# Patient Record
Sex: FEMALE | Race: BLACK OR AFRICAN AMERICAN | Employment: OTHER | URBAN - METROPOLITAN AREA
[De-identification: names, ages, dates, MRNs, and addresses within clinical notes are randomized per-mention and may not be internally consistent; named-entity substitution may affect disease eponyms.]

---

## 2017-06-20 ENCOUNTER — TRANSFERRED RECORDS (OUTPATIENT)
Dept: HEALTH INFORMATION MANAGEMENT | Facility: CLINIC | Age: 72
End: 2017-06-20

## 2017-06-20 LAB
ALT SERPL-CCNC: 15 IU/L
AST SERPL-CCNC: 20 IU/L (ref 5–40)
CREAT SERPL-MCNC: 1.11 MG/DL (ref 0.5–1)
GFR SERPL CREATININE-BSD FRML MDRD: 48 ML/MIN/1.73M2
GLUCOSE SERPL-MCNC: 85 MG/DL (ref 70–100)
LDLC SERPL CALC-MCNC: 86 MG/DL
POTASSIUM SERPL-SCNC: 4.1 MEQ/L (ref 3.5–5.3)
TSH SERPL-ACNC: 0.98 MU/L (ref 0.3–4.2)

## 2017-08-14 ENCOUNTER — OFFICE VISIT (OUTPATIENT)
Dept: INTERNAL MEDICINE | Facility: CLINIC | Age: 72
End: 2017-08-14
Payer: COMMERCIAL

## 2017-08-14 ENCOUNTER — RADIANT APPOINTMENT (OUTPATIENT)
Dept: BONE DENSITY | Facility: CLINIC | Age: 72
End: 2017-08-14
Attending: INTERNAL MEDICINE
Payer: COMMERCIAL

## 2017-08-14 VITALS
HEIGHT: 60 IN | BODY MASS INDEX: 30.35 KG/M2 | WEIGHT: 154.6 LBS | TEMPERATURE: 97.4 F | DIASTOLIC BLOOD PRESSURE: 56 MMHG | OXYGEN SATURATION: 100 % | HEART RATE: 73 BPM | SYSTOLIC BLOOD PRESSURE: 112 MMHG

## 2017-08-14 DIAGNOSIS — J45.909 UNCOMPLICATED ASTHMA, UNSPECIFIED ASTHMA SEVERITY: ICD-10-CM

## 2017-08-14 DIAGNOSIS — R53.83 OTHER FATIGUE: ICD-10-CM

## 2017-08-14 DIAGNOSIS — A04.8 H. PYLORI INFECTION: ICD-10-CM

## 2017-08-14 DIAGNOSIS — Z13.820 SCREENING FOR OSTEOPOROSIS: ICD-10-CM

## 2017-08-14 DIAGNOSIS — M79.10 MUSCLE PAIN: ICD-10-CM

## 2017-08-14 DIAGNOSIS — Z78.0 ASYMPTOMATIC POSTMENOPAUSAL STATUS: ICD-10-CM

## 2017-08-14 DIAGNOSIS — R29.898 LEG WEAKNESS, BILATERAL: ICD-10-CM

## 2017-08-14 DIAGNOSIS — E78.5 HYPERLIPIDEMIA LDL GOAL <130: Primary | ICD-10-CM

## 2017-08-14 DIAGNOSIS — Z95.2 S/P AORTIC VALVE REPLACEMENT: ICD-10-CM

## 2017-08-14 DIAGNOSIS — E55.9 VITAMIN D DEFICIENCY: ICD-10-CM

## 2017-08-14 DIAGNOSIS — R49.0 HOARSENESS: ICD-10-CM

## 2017-08-14 PROBLEM — R41.3 MEMORY DEFICIT: Status: ACTIVE | Noted: 2017-08-14

## 2017-08-14 PROCEDURE — 99204 OFFICE O/P NEW MOD 45 MIN: CPT | Performed by: INTERNAL MEDICINE

## 2017-08-14 PROCEDURE — 77085 DXA BONE DENSITY AXL VRT FX: CPT | Performed by: INTERNAL MEDICINE

## 2017-08-14 RX ORDER — ALBUTEROL SULFATE 90 UG/1
2 AEROSOL, METERED RESPIRATORY (INHALATION) EVERY 6 HOURS PRN
Qty: 1 INHALER | Refills: 1 | COMMUNITY
Start: 2017-08-14 | End: 2019-01-25

## 2017-08-14 RX ORDER — OXYBUTYNIN CHLORIDE 10 MG/1
10 TABLET, EXTENDED RELEASE ORAL DAILY PRN
Qty: 30 TABLET | COMMUNITY
Start: 2017-08-14 | End: 2017-11-14

## 2017-08-14 NOTE — PATIENT INSTRUCTIONS
Plan:  1. Avoid acid ( lemon, orange) and spicy food and coffee   2. Resume Prevacid   3. Physical therapy  [facility], [phone]  4. ENT referral for hoarseness   5. Follow up in 2-3 months for annual exam

## 2017-08-14 NOTE — MR AVS SNAPSHOT
After Visit Summary   8/14/2017    Raissa Encarnacion    MRN: [id number]           Patient Information     Date Of Birth          1945        Visit Information        Provider Department      8/14/2017 12:30 PM Hayley Francisco MD; [facility] [facility]        Today's Diagnoses     Hyperlipidemia LDL goal <130    -  1    Screening for osteoporosis        Asymptomatic postmenopausal status        Uncomplicated asthma, unspecified asthma severity        Muscle pain        Vitamin D deficiency        Other fatigue        H. pylori infection        Leg weakness, bilateral        Hoarseness          Care Instructions    Plan:  1. Avoid acid ( lemon, orange) and spicy food and coffee   2. Resume Prevacid   3. Physical therapy  [facility], [phone]  4. ENT referral for hoarseness   5. Follow up in 2-3 months for annual exam          Follow-ups after your visit        Additional Services     [facility] PT, HAND, AND CHIROPRACTIC REFERRAL       === This order will print in the [facility] Scheduling  Office ===    Your provider has referred you to: [place] [facility], [phone]     Indication/Reason for Referral: gen weakness/leg weakness  Onset of Illness:  months  Therapy Orders: Physical Therapy per protocol or clinical pathway.  Special Programs None   Special Request:Eval & Treat  Modalities: As indicated  Equipment: As indicated    Additional Comments:       Please be aware that coverage of these services is subject to the terms and limitations of your health insurance plan.  Call member services at your health plan with any benefit or coverage questions.      Please bring the following to your appointment:    >>   Any x-rays, CTs or MRIs which have been performed.  Contact the facility where they were done to arrange for  prior to your scheduled appointment.  Any new CT, MRI or other procedures ordered by your specialist  must be performed at a [facility] facility or coordinated by your clinic's referral office.    >>   List of current medications   >>   This referral request   >>   Any documents/labs given to you for this referral            OTOLARYNGOLOGY REFERRAL       Your provider has referred you to:     N: [facility] [place] [phone]   [email or web address]    FHN: [facility] [phone]   [email or web address]  N: [facility] [phone]   [email or web address]  [facility]: [facility] [place]  [facility] [place] [place] [phone]   [email or web address]  N: [place] [facility] [place] [phone]   [email or web address]  N: [place] [facility] ([facility] [phone]   [email or web address]  [facility]: [facility] [phone]   [email or web address]    Please be aware that coverage of these services is subject to the terms and limitations of your health insurance plan.  Call member services at your health plan with any benefit or coverage questions.      Please bring the following to your appointment:  >>   Any x-rays, CTs or MRIs which have been performed.  Contact the facility where they were done to arrange for  prior to your scheduled appointment.  Any new CT, MRI or other procedures ordered by your specialist must be performed at a [facility] facility or coordinated by your clinic's referral office.    >>   List of current medications   >>   This referral request   >>   Any documents/labs given to you for this referral                  Future tests that were ordered for you today     Open Future Orders        Priority Expected Expires Ordered    H Pylori antigen, stool Routine  9/13/2017 8/14/2017    CBC with platelets Routine  8/14/2018  "2017    Comprehensive metabolic panel Routine  2018    Lipid panel reflex to direct LDL Routine  2018    TSH with free T4 reflex Routine  2018    Vitamin D Deficiency Routine  2018    Vitamin B12 Routine  2018    CK total Routine  2018    DX Hip/Pelvis/Spine Routine  2018            Who to contact     If you have questions or need follow up information about today's clinic visit or your schedule please contact [facility] directly at [phone].  Normal or non-critical lab and imaging results will be communicated to you by MyChart, letter or phone within 4 business days after the clinic has received the results. If you do not hear from us within 7 days, please contact the clinic through [facility] or phone. If you have a critical or abnormal lab result, we will notify you by phone as soon as possible.  Submit refill requests through [employer] or call your pharmacy and they will forward the refill request to us. Please allow 3 business days for your refill to be completed.          Additional Information About Your Visit        [facility] Information     [employer] lets you send messages to your doctor, view your test results, renew your prescriptions, schedule appointments and more. To sign up, go to [email or web address] . Click on \"Log in\" on the left side of the screen, which will take you to the Welcome page. Then click on \"Sign up Now\" on the right side of the page.     You will be asked to enter the access code listed below, as well as some personal information. Please follow the directions to create your username and password.     Your access code is: [email or web address]  Expires: 2017  1:49 PM     Your access code will  in 90 days. If you need help or a new code, please call your [facility] or [phone].        Care EveryWhere ID     This is your Care EveryWhere ID. This could " "be used by other organizations to access your [facility] medical records  [id number]        Your Vitals Were     Pulse Temperature Height Pulse Oximetry Breastfeeding? BMI (Body Mass Index)    73 97.4  F (36.3  C) (Oral) 4' 11.5\" (1.511 m) 100% No 30.7 kg/m2       Blood Pressure from Last 3 Encounters:   08/14/17 112/56   11/28/16 133/65    Weight from Last 3 Encounters:   08/14/17 154 lb 9.6 oz (70.1 kg)   10/24/16 154 lb 9.6 oz (70.1 kg)   09/28/16 155 lb 3.2 oz (70.4 kg)              We Performed the Following     Asthma Action Plan (AAP)     [facility] PT, HAND, AND CHIROPRACTIC REFERRAL     OTOLARYNGOLOGY REFERRAL          Today's Medication Changes          These changes are accurate as of: 8/14/17  1:49 PM.  If you have any questions, ask your nurse or doctor.               These medicines have changed or have updated prescriptions.        Dose/Directions    DITROPAN XL 10 MG 24 hr tablet   This may have changed:    - when to take this  - reasons to take this   Generic drug:  oxybutynin   Changed by:  Hayley Francisco MD        Dose:  10 mg   Take 1 tablet (10 mg) by mouth daily as needed for bladder spasms   Quantity:  30 tablet   Refills:  0         Stop taking these medicines if you haven't already. Please contact your care team if you have questions.     LIPITOR 20 MG tablet   Generic drug:  atorvastatin   Stopped by:  Hayley Francisco MD                    Primary Care Provider    None Specified       No primary provider on file.        Equal Access to Services     [facility]: Hadii andrey morales Sotavo, waaxda luqadaha, qaybta kaalmada josef godoy . So [facility] [phone].    ATENCIÓN: Si habla español, tiene a montes de oca disposición servicios gratuitos de asistencia lingüística. Llame al [phone].    We comply with applicable federal civil rights laws and [place] laws. We do not discriminate on the basis of race, color, national origin, " age, disability sex, sexual orientation or gender identity.            Thank you!     Thank you for choosing [facility]  for your care. Our goal is always to provide you with excellent care. Hearing back from our patients is one way we can continue to improve our services. Please take a few minutes to complete the written survey that you may receive in the mail after your visit with us. Thank you!             Your Updated Medication List - Protect others around you: Learn how to safely use, store and throw away your medicines at www.disposemymeds.org.          This list is accurate as of: 8/14/17  1:49 PM.  Always use your most recent med list.                   Brand Name Dispense Instructions for use Diagnosis    * albuterol (2.5 MG/3ML) 0.083% neb solution     75 mL    Take 1 vial (2.5 mg) by nebulization every 6 hours as needed for shortness of breath / dyspnea or wheezing        * albuterol 108 (90 BASE) MCG/ACT Inhaler    PROAIR HFA/PROVENTIL HFA/VENTOLIN HFA    1 Inhaler    Inhale 2 puffs into the lungs every 6 hours as needed for shortness of breath / dyspnea or wheezing        aspirin EC 81 MG EC tablet      Take 81 mg by mouth        calcium carbonate 500 MG chewable tablet    TUMS     1 tab q6h prn heartburn        carboxymethylcellulose 0.5 % Soln ophthalmic solution    REFRESH PLUS     1 drop        cholecalciferol 1000 UNIT tablet    vitamin D     Take 1,000 Units by mouth        DITROPAN XL 10 MG 24 hr tablet   Generic drug:  oxybutynin     30 tablet    Take 1 tablet (10 mg) by mouth daily as needed for bladder spasms        loratadine 10 MG tablet    CLARITIN     Take 10 mg by mouth        MULTI-VITAMINS Tabs      Take 1 tablet by mouth        TOPROL XL 25 MG 24 hr tablet   Generic drug:  metoprolol      Take 25 mg by mouth        * Notice:  This list has 2 medication(s) that are the same as other medications prescribed for you. Read the directions carefully, and ask your doctor or  other care provider to review them with you.

## 2017-08-14 NOTE — PROGRESS NOTES
Dr Story's note      Patient's instructions / PLAN:                                                        Plan:  1. Avoid acid ( lemon, orange) and spicy food and coffee   2. Resume Prevacid   3. Physical therapy  [facility], [phone]  4. ENT referral for hoarseness   5. Follow up in 2-3 months for annual exam        ASSESSMENT & PLAN:                                                      Complicated medical case, but her advantage to her daughter is in top of all the medical problems, takes a good care of her mother and she makes sure she has all her appointment and tests done.    (E78.5) Hyperlipidemia LDL goal <130  (primary encounter diagnosis)  Comment:   Plan: CBC with platelets, Comprehensive metabolic         panel, Lipid panel reflex to direct LDL, TSH         with free T4 reflex            (Z95.2) S/P aortic valve replacement Abbott, life long ASA, Plavix  Comment: Doing well  Plan: Continue same meds, same doses for now     (J45.909) Uncomplicated asthma, unspecified asthma severity  Comment: stable   Plan: Asthma Action Plan (AAP), CANCELED: DX         Hip/Pelvis/Spine            (M79.1) Muscle pain  Comment:   Plan: CK total            (E55.9) Vitamin D deficiency  Comment:   Plan: Vitamin D Deficiency            (R53.83) Other fatigue  Comment:   Plan: CBC with platelets, Comprehensive metabolic         panel, Vitamin B12            (A04.8) H. pylori infection  Comment:   Plan: H Pylori antigen, stool            (R29.898) Leg weakness, bilateral  Comment:   Plan: [facility] PT, HAND, AND CHIROPRACTIC REFERRAL            (R49.0) Hoarseness  Comment:   Plan: OTOLARYNGOLOGY REFERRAL            (Z13.820) Screening for osteoporosis  Comment:   Plan: CANCELED: DX Hip/Pelvis/Spine            (Z78.0) Asymptomatic postmenopausal status  Comment:   Plan:        Chief complaint:                                                      Follow up chronic medical problems    daughter and official  " are present     SUBJECTIVE:                                                    Raissa Encarnacion is a 72 year old female who presents to clinic today for the following health issues:    The patient used to see dr at [facility], they ( patient and daughter) loved the doctor but traveling has becoming harder and they want a pcp closer to their home.     She had AV replacement Sept 2016, she will be on Plavix until Nov 2017 and life long ASA. She c/o of tenderness over the ant chest scar: cheloid. She will ds this with the thoracic surgeon.     Had labs June 2017: I reviewed them and they are in acceptable range including the cholesterol.     Had history of H. pylori, she had treatment for this.  She still has heartburns.  She stopped taking the medications for heartburn.  Now she drinks lemon juice with water in the morning based on a relative advice.    She has memory problems but her daughter is helping with all the care, as above    She is generalized weak, leg weakness.  I suggested physical therapy and her daughter is very interested in this.    She has history of asthma and has received numerous cortisone treatments in [place] country.  Daughter agreed to schedule DEXA    Hoarseness for few months.  We will treat GERD but difficult course and this persists she will see ENT    Review of Systems:                                                      ROS: negative for fever, chills, cough, wheezes, chest pain, shortness of breath, vomiting, abdominal pain, leg swelling     A 10-point review of systems was obtained.  Those pertinent are above and in the in the Subjective section.  The rest of the systems are negative.           OBJECTIVE:             Physical exam:  Blood pressure 112/56, pulse 73, temperature 97.4  F (36.3  C), temperature source Oral, height 4' 11.5\" (1.511 m), weight 154 lb 9.6 oz (70.1 kg), SpO2 100 %, not currently breastfeeding.   NAD, appears comfortable  Skin: no rashes   HEENT: KINZA TAY, " pink conjunctiva, anicteric sclerae, bilateral tympanic membranes are clinically normal, oropharynx is normal color  Neck: supple, no JVD,No thyroidmegaly. Lymph nodes nonpalpable cervical and supraclavicular.  Chest: clear to auscultation bilaterally, good respiratory effort  Heart: S1 S2, RRR, no mgr appreciated  Abdomen: soft, not tender, no hepatosplenomegaly or masses appreciated, no abdominal bruit, present bowel sounds  Extremities: no edema,   Neurologic: A,  no focal signs appreciated    Past Medical History:   Diagnosis Date     Memory deficit 8/14/2017     S/P aortic valve replacement Abbott, life long ASA, Plavix 9/14/2016   as above       PSH: A valve replac    Meds: reviewed  Current Outpatient Prescriptions   Medication Sig Dispense Refill     oxybutynin (DITROPAN XL) 10 MG 24 hr tablet Take 1 tablet (10 mg) by mouth daily as needed for bladder spasms 30 tablet      albuterol (PROAIR HFA/PROVENTIL HFA/VENTOLIN HFA) 108 (90 BASE) MCG/ACT Inhaler Inhale 2 puffs into the lungs every 6 hours as needed for shortness of breath / dyspnea or wheezing 1 Inhaler 1     aspirin EC 81 MG tablet Take 81 mg by mouth       calcium carbonate (TUMS) 500 MG chewable tablet 1 tab q6h prn heartburn       carboxymethylcellulose (REFRESH PLUS) 0.5 % SOLN 1 drop       cholecalciferol (VITAMIN D3) 1000 UNIT tablet Take 1,000 Units by mouth       loratadine (CLARITIN) 10 MG tablet Take 10 mg by mouth       metoprolol (TOPROL XL) 25 MG 24 hr tablet Take 25 mg by mouth       Multiple Vitamin (MULTI-VITAMINS) TABS Take 1 tablet by mouth       albuterol (2.5 MG/3ML) 0.083% nebulizer solution Take 1 vial (2.5 mg) by nebulization every 6 hours as needed for shortness of breath / dyspnea or wheezing 75 mL 0       Soc Hx: reviewed  Fam Hx: reviewed          Hayley Story MD  Internal Medicine

## 2017-08-14 NOTE — NURSING NOTE
"Chief Complaint   Patient presents with     Establish Care       Initial /56 (BP Location: Right arm, Patient Position: Chair, Cuff Size: Adult Large)  Pulse 73  Temp 97.4  F (36.3  C) (Oral)  Ht 4' 11.5\" (1.511 m)  Wt 154 lb 9.6 oz (70.1 kg)  SpO2 100%  Breastfeeding? No  BMI 30.7 kg/m2 Estimated body mass index is 30.7 kg/(m^2) as calculated from the following:    Height as of this encounter: 4' 11.5\" (1.511 m).    Weight as of this encounter: 154 lb 9.6 oz (70.1 kg).  Medication Reconciliation: complete   Mammogram and colonoscopy declined today-she does not want to do these now or in the future. No need to call for health maintenance reminders for this.   Vicki Nguyen CMA      "

## 2017-08-15 ASSESSMENT — ASTHMA QUESTIONNAIRES: ACT_TOTALSCORE: 18

## 2017-08-25 DIAGNOSIS — Z95.2 S/P AORTIC VALVE REPLACEMENT: Primary | ICD-10-CM

## 2017-08-25 RX ORDER — METOPROLOL SUCCINATE 25 MG/1
TABLET, EXTENDED RELEASE ORAL
Qty: 90 TABLET | Refills: 0 | Status: SHIPPED | OUTPATIENT
Start: 2017-08-25 | End: 2017-11-14

## 2017-08-25 NOTE — TELEPHONE ENCOUNTER
metoprolol      Last Written Prescription Date: historical  Last Fill Quantity: 0, # refills: 0    Last Office Visit with G, P or [facility] prescribing provider:  8/14/17   Future Office Visit:    Next 5 appointments (look out 90 days)     Nov 14, 2017 10:20 AM CST   [facility]YS[facility] Francisco MD   [facility]    [address]  [facility] [id number] [phone]                    BP Readings from Last 3 Encounters:   08/14/17 112/56   11/28/16 133/65

## 2017-09-18 ENCOUNTER — CARE COORDINATION (OUTPATIENT)
Dept: GERIATRIC MEDICINE | Facility: CLINIC | Age: 72
End: 2017-09-18

## 2017-09-18 NOTE — PROGRESS NOTES
New member with [facility] effective 9/1/17. Welcome letter mailed on 9/7/17.    PC to Cecelia, daughter.  She said she was very unhappy with the last assessment. She said the CM was rude and rigid.  Not happy with the PCA results.  She said she does everything for her mom.  Talked about how PCA assesses for the hands on cares.  The IADL's are covered under the homemaking services.  That seemed to help ease her frustration. Reviewed the PCA assessment. Talked about the three areas that did not get counted on the assessment as a dependency- transferring, mobility, and positioning. Cecelia said her mom and transfer and position herself.  They help her with mobility. She can walk around the apartment but Cecelia or her family is there beside her to supervise and monitor. She leave the building everyday and needs support on the one step into the building.  Has weakness from her health and surgery last year.  Let her know that if I came to do assessment it would possibly change the hours by 30 minute increase.  Let her know I could come now to reassess, or come after PT has completed to see if there is improvement. She said her mom has done PT before and it didn't give that much improvement. She would like new assessment. Daughter has her set up for PT, will be making ENT appt, and has completed DEXA scan.  Cecelia reports that she keeps track of everything for her mom from household to meds and doctor appts. Scheduled home visit for 9/21/17 at 9:30.    PC to K[facility] and schedule . Requested Kristy or female .    OTIS Carr  [facility]   [phone] (fax)  [email or web address]

## 2017-09-21 ENCOUNTER — CARE COORDINATION (OUTPATIENT)
Dept: GERIATRIC MEDICINE | Facility: CLINIC | Age: 72
End: 2017-09-21

## 2017-09-21 DIAGNOSIS — E78.5 HYPERLIPIDEMIA LDL GOAL <130: ICD-10-CM

## 2017-09-21 DIAGNOSIS — M79.10 MUSCLE PAIN: ICD-10-CM

## 2017-09-21 DIAGNOSIS — R53.83 OTHER FATIGUE: ICD-10-CM

## 2017-09-21 DIAGNOSIS — E55.9 VITAMIN D DEFICIENCY: ICD-10-CM

## 2017-09-21 LAB
ALBUMIN SERPL-MCNC: 3.8 G/DL (ref 3.4–5)
ALP SERPL-CCNC: 88 U/L (ref 40–150)
ALT SERPL W P-5'-P-CCNC: 24 U/L (ref 0–50)
ANION GAP SERPL CALCULATED.3IONS-SCNC: 7 MMOL/L (ref 3–14)
AST SERPL W P-5'-P-CCNC: 16 U/L (ref 0–45)
BILIRUB SERPL-MCNC: 0.6 MG/DL (ref 0.2–1.3)
BUN SERPL-MCNC: 12 MG/DL (ref 7–30)
CALCIUM SERPL-MCNC: 9.3 MG/DL (ref 8.5–10.1)
CHLORIDE SERPL-SCNC: 106 MMOL/L (ref 94–109)
CHOLEST SERPL-MCNC: 261 MG/DL
CK SERPL-CCNC: 74 U/L (ref 30–225)
CO2 SERPL-SCNC: 28 MMOL/L (ref 20–32)
CREAT SERPL-MCNC: 1 MG/DL (ref 0.52–1.04)
ERYTHROCYTE [DISTWIDTH] IN BLOOD BY AUTOMATED COUNT: 13.4 % (ref 10–15)
GFR SERPL CREATININE-BSD FRML MDRD: 54 ML/MIN/1.7M2
GLUCOSE SERPL-MCNC: 96 MG/DL (ref 70–99)
HCT VFR BLD AUTO: 38.7 % (ref 35–47)
HDLC SERPL-MCNC: 46 MG/DL
HGB BLD-MCNC: 13 G/DL (ref 11.7–15.7)
LDLC SERPL CALC-MCNC: 179 MG/DL
MCH RBC QN AUTO: 29.2 PG (ref 26.5–33)
MCHC RBC AUTO-ENTMCNC: 33.6 G/DL (ref 31.5–36.5)
MCV RBC AUTO: 87 FL (ref 78–100)
NONHDLC SERPL-MCNC: 215 MG/DL
PLATELET # BLD AUTO: 175 10E9/L (ref 150–450)
POTASSIUM SERPL-SCNC: 4.6 MMOL/L (ref 3.4–5.3)
PROT SERPL-MCNC: 7.2 G/DL (ref 6.8–8.8)
RBC # BLD AUTO: 4.45 10E12/L (ref 3.8–5.2)
SODIUM SERPL-SCNC: 141 MMOL/L (ref 133–144)
TRIGL SERPL-MCNC: 182 MG/DL
TSH SERPL DL<=0.005 MIU/L-ACNC: 1.36 MU/L (ref 0.4–4)
VIT B12 SERPL-MCNC: 506 PG/ML (ref 193–986)
WBC # BLD AUTO: 7.5 10E9/L (ref 4–11)

## 2017-09-21 PROCEDURE — 80061 LIPID PANEL: CPT | Performed by: INTERNAL MEDICINE

## 2017-09-21 PROCEDURE — 82306 VITAMIN D 25 HYDROXY: CPT | Performed by: INTERNAL MEDICINE

## 2017-09-21 PROCEDURE — 82607 VITAMIN B-12: CPT | Performed by: INTERNAL MEDICINE

## 2017-09-21 PROCEDURE — 36415 COLL VENOUS BLD VENIPUNCTURE: CPT | Performed by: INTERNAL MEDICINE

## 2017-09-21 PROCEDURE — 80053 COMPREHEN METABOLIC PANEL: CPT | Performed by: INTERNAL MEDICINE

## 2017-09-21 PROCEDURE — 82550 ASSAY OF CK (CPK): CPT | Performed by: INTERNAL MEDICINE

## 2017-09-21 PROCEDURE — 85027 COMPLETE CBC AUTOMATED: CPT | Performed by: INTERNAL MEDICINE

## 2017-09-21 PROCEDURE — 84443 ASSAY THYROID STIM HORMONE: CPT | Performed by: INTERNAL MEDICINE

## 2017-09-21 RX ORDER — PANTOPRAZOLE SODIUM 40 MG/1
40 TABLET, DELAYED RELEASE ORAL
COMMUNITY
Start: 2017-08-31 | End: 2018-02-22

## 2017-09-21 NOTE — PROGRESS NOTES
Home visit/Michael Risk Assessment/EW screening completed on: 9/21/17 with Raissa, daughter Cecelia, this Care Coordinator, and .  Had a long discussion about current needs, what is assessed to qualify for services, and how we can support independence. Raissa has experienced much leg weakness and general weakness since her heart surgery in November 2016. Her asthma also affects her ability to manage her cares.  Raissa was independent prior to her surgery and plans to become as independent as possible. PT was referred by PCP and is scheduled to start in October.  Has ENT visit to evaluate issues with hoarseness in her throat.   Needs assistance with dressing, grooming, bathing, mobility, toileting, shopping, cooking, cleaning, laundry, financial management, and med management.  Raissa's telly is important to her and she enjoys being with her family.     Member resides: Lives with her daughter and two grandchildren ages 4 and 6 in a two bedroom apartment.   Member currently receiving the following services: PCA and homemaking    See EMR for a list of client's diagnoses and medications.     Medication management:   Medications reviewed:Yes.   Medication management: Care giver administers medication.   Medication understanding:Caregiver has understanding of regimen and is able to complete med set up/administration Yes. MTM offered- gave phone number for scheduling line.    Member Mood/behavior-PHQ9 score:  0 Any needs to f/u with PCP on PHQ9 score- no    Plan of Care: Early assessment was completed because member and daughter was not happy with the last  or assessment.  They did not feel that her needs were captured by last assessment, especially with PCA hours.  Recommendation is for member to continue with PCA and homemaking, as well as add lifeline and pads.  PCA hours have increased from 3 hrs/day to 3.5 hrs/day. Member will be starting PT in October.     Follow-Up Plan: Member informed of future  contact, plan to f/u with member with a 6 month telephone assessment.  Contact information shared with member and family, encouraged member to call with any questions or concerns prior to this.    See [facility] for further detailed information.    OTIS Carr  [employer]   [phone] (fax)  [email or web address]

## 2017-09-21 NOTE — PROGRESS NOTES
Order placed with [facility] (p: [phone]; f: [phone]) for pads.  Order placed on 09/21/2017. Access updated.  As required, authorization submitted to health plan.      Annie Lozada  Case Management Specialist  [facility]   [phone]

## 2017-09-22 ENCOUNTER — TELEPHONE (OUTPATIENT)
Dept: INTERNAL MEDICINE | Facility: CLINIC | Age: 72
End: 2017-09-22

## 2017-09-22 LAB — DEPRECATED CALCIDIOL+CALCIFEROL SERPL-MC: 39 UG/L (ref 20–75)

## 2017-09-23 NOTE — TELEPHONE ENCOUNTER
There are no records in [facility] or [facility], about possible urinary incontinence  For not signed  Form can be addressed at the next office visit

## 2017-09-25 ENCOUNTER — TRANSFERRED RECORDS (OUTPATIENT)
Dept: HEALTH INFORMATION MANAGEMENT | Facility: CLINIC | Age: 72
End: 2017-09-25

## 2017-10-05 ENCOUNTER — CARE COORDINATION (OUTPATIENT)
Dept: GERIATRIC MEDICINE | Facility: CLINIC | Age: 72
End: 2017-10-05

## 2017-10-05 NOTE — PROGRESS NOTES
Received after visit chart from care coordinator.  Completed following tasks: Mailed copy of care plan to client  Updated services in access  Submitted referrals/auths for Homemaking  Entered MMIS  Chart was returned to DANDRE.     Annie Lozada  Case Management Specialist  [facility]   [phone]

## 2017-10-05 NOTE — PROGRESS NOTES
Made referral for GPS lifeline with  [employer].  Button is on back order but they will call daughter to set up once they get it in.    PC to Cecelia, daughter. Let her know lifeline referral has been made. Button on back order. Pads order was made but PCP wants to see member before she will sign off on rx. They will discuss at next visit in November. Let her know that [facility] has nebulizers so once she gets the rx I can help her get that as needed.    OTIS Carr  [employer]   [phone] (fax)  [email or web address]

## 2017-10-05 NOTE — PROGRESS NOTES
[facility]:  Faxed completed PCA assessment and authorization letter to PCA Agency and mailed copies to member.  Faxed authorization letter and MD Communication to PCP.  Emailed referral form for auth to [facility]. Two auths were completed for the date spans of 09/01/2017-09/22/2017 and 09/23/2017 - 08/31/2018.    Annie Lozada  Case Management Specialist  [facility]   [phone]

## 2017-10-09 ENCOUNTER — THERAPY VISIT (OUTPATIENT)
Dept: PHYSICAL THERAPY | Facility: CLINIC | Age: 72
End: 2017-10-09
Payer: COMMERCIAL

## 2017-10-09 DIAGNOSIS — R29.898 LEG WEAKNESS, BILATERAL: Primary | ICD-10-CM

## 2017-10-09 PROCEDURE — 97110 THERAPEUTIC EXERCISES: CPT | Mod: GP | Performed by: PHYSICAL THERAPIST

## 2017-10-09 PROCEDURE — 97162 PT EVAL MOD COMPLEX 30 MIN: CPT | Mod: GP | Performed by: PHYSICAL THERAPIST

## 2017-10-09 NOTE — MR AVS SNAPSHOT
"              After Visit Summary   10/9/2017    Raissa Encarnacion    MRN: [id number]           Patient Information     Date Of Birth          1945        Visit Information        Provider Department      10/9/2017 11:30 AM Lena Santiago, PT; [employer] [facility]        Today's Diagnoses     Leg weakness, bilateral    -  1       Follow-ups after your visit        Your next 10 appointments already scheduled     Oct 13, 2017  9:40 AM CDT   AVTAR Extremity with Vivienne Jaramillo PT   [facility] ([facility]  )    [address]  [address]  [facility] [address]   [phone]            Nov 14, 2017 10:20 AM CST   PHYSICAL with Hayley Francisco MD   [facility] ([facility])    [address] [place]  [facility] [id number] [phone]              Who to contact     If you have questions or need follow up information about today's clinic visit or your schedule please contact [facility] directly at [phone].  Normal or non-critical lab and imaging results will be communicated to you by [employer], letter or phone within 4 business days after the clinic has received the results. If you do not hear from us within 7 days, please contact the clinic through [employer] or phone. If you have a critical or abnormal lab result, we will notify you by phone as soon as possible.  Submit refill requests through [employer] or call your pharmacy and they will forward the refill request to us. Please allow 3 business days for your refill to be completed.          Additional Information About Your Visit        [employer] Information     Very[employer]Room lets you send messages to your doctor, view your test results, renew your prescriptions, schedule appointments and more. To sign up, go to [email or web address] . Click on \"Log in\" on the left side of the screen, which will take you to the Welcome page. Then click on \"Sign up Now\" on the " right side of the page.     You will be asked to enter the access code listed below, as well as some personal information. Please follow the directions to create your username and password.     Your access code is: [email or web address]  Expires: 2017  1:49 PM     Your access code will  in 90 days. If you need help or a new code, please call your [facility] clinic or [phone].        Care EveryWhere ID     This is your Care EveryWhere ID. This could be used by other organizations to access your [facility] medical records  [id number]         Blood Pressure from Last 3 Encounters:   17 112/56   16 133/65    Weight from Last 3 Encounters:   17 70.1 kg (154 lb 9.6 oz)   10/24/16 70.1 kg (154 lb 9.6 oz)   16 70.4 kg (155 lb 3.2 oz)              We Performed the Following     HC PT EVAL, MODERATE COMPLEXITY     AVTAR INITIAL EVAL REPORT     THERAPEUTIC EXERCISES        Primary Care Provider Office Phone # Fax #    Hayley Saima Francisco [phone]       [address] [facility] [address]        Equal Access to Services     [employer] : Hadii aad ku hadasho Soomaali, waaxda luqadaha, qaybta kaalmada adeegyada, josef maddox haycecyn abebe young . So [facility] [phone].    ATENCIÓN: Si habla español, tiene a montes de oca disposición servicios gratuitos de asistencia lingüística. Llame al [phone].    We comply with applicable federal civil rights laws and [place] laws. We do not discriminate on the basis of race, color, national origin, age, disability, sex, sexual orientation, or gender identity.            Thank you!     Thank you for choosing [facility]  for your care. Our goal is always to provide you with excellent care. Hearing back from our patients is one way we can continue to improve our services. Please take a few minutes to complete the written survey that you may receive in the mail after your visit with us. Thank you!             Your  Updated Medication List - Protect others around you: Learn how to safely use, store and throw away your medicines at www.disposemymeds.org.          This list is accurate as of: 10/9/17  1:16 PM.  Always use your most recent med list.                   Brand Name Dispense Instructions for use Diagnosis    * albuterol (2.5 MG/3ML) 0.083% neb solution     75 mL    Take 1 vial (2.5 mg) by nebulization every 6 hours as needed for shortness of breath / dyspnea or wheezing        * albuterol 108 (90 BASE) MCG/ACT Inhaler    PROAIR HFA/PROVENTIL HFA/VENTOLIN HFA    1 Inhaler    Inhale 2 puffs into the lungs every 6 hours as needed for shortness of breath / dyspnea or wheezing        aspirin EC 81 MG EC tablet      Take 81 mg by mouth        calcium carbonate 500 MG chewable tablet    TUMS     1 tab q6h prn heartburn        carboxymethylcellulose 0.5 % Soln ophthalmic solution    REFRESH PLUS     1 drop        cholecalciferol 1000 UNIT tablet    vitamin D     Take 1,000 Units by mouth        DITROPAN XL 10 MG 24 hr tablet   Generic drug:  oxybutynin     30 tablet    Take 1 tablet (10 mg) by mouth daily as needed for bladder spasms        loratadine 10 MG tablet    CLARITIN     Take 10 mg by mouth        metoprolol 25 MG 24 hr tablet    TOPROL-XL    90 tablet    TAKE 1 TABLET BY MOUTH EVERY DAY    S/P aortic valve replacement       MULTI-VITAMINS Tabs      Take 1 tablet by mouth        pantoprazole 40 MG EC tablet    PROTONIX     Take 40 mg by mouth daily (with breakfast)        * Notice:  This list has 2 medication(s) that are the same as other medications prescribed for you. Read the directions carefully, and ask your doctor or other care provider to review them with you.

## 2017-10-09 NOTE — PROGRESS NOTES
"Subjective:    Patient is a 72 year old female presenting with rehab back hpi. The history is provided by the patient. A  was used (Zimbabwean).   Affected Side: B leg weakness.    Condition occurred: for unknown reasons.  This is a chronic condition  \"Overall my body muscles/nerves hurt\". Reports chief c/o B leg weakness with walking and feels like they give out occasionally. Also difficulty with getting up from low seat. Denies N/T. Lives in apartment, no stairs, doesn't do a lot of activity. Had open-heart surgery ~August 2016. Did cardiac rehab for 1 month post-op then stopped but feels afraid of falling due to leg strength. Does walk hallways at apartment. No falls but has a few \"close calls\". Goal is to get stronger and feel more steady on feet. .      Radiates to:  Thigh right, thigh left, knee left and knee right (crepitus B knees).  Pain is described as aching and is constant and reported as 7/10.   Pain is worse during the day.  Symptoms are exacerbated by walking, sitting and standing and relieved by rest.  Since onset symptoms are gradually worsening.        General health as reported by patient is good.  Pertinent medical history includes:  Rheumatoid arthritis and heart problems.  Medical allergies: no.  Other surgeries include:  Heart surgery (open heart surgery Aug 2016).  Current medications:  Cardiac medication.  Current occupation is retired.                                    Objective:      Gait:  Slowed, lateral shifting noted  Gait Type:  Antalgic                                                           Knee Evaluation:  ROM:  Strength wnl knee: SLR R x 7 reps with good control, L x3 then extensor lag starts.    PROM      Extension: Left: 0    Right:  0  Flexion: Left: 110    Right:  115      Strength:         Quad Set Left:  Poor    Pain: +   Quad Set Right:  Fair    Pain: +/-  Ligament Testing:  Not Assessed                Special Tests:   Left knee positive for the " following special tests:  Patellar Compression  Right knee positive for the following tests:  Patellar Compression    Edema:  Normal    Mobility Testing:      Patellofemoral Medial:  Left: normal    Right: normal  Patellofemoral Lateral:  Left: normal    Right: normal  Patellofemoral Superior:  Left: normal    Right: normal  Patellofemoral Inferior:  Left: normal    Right: normal  Functional Testing:            Proprioception:   Stork Balance Test:  Left:  Wouldn't try without hand support  Right:  Wouldn't try without hand support  % of Uninvolved:           General     ROS    Assessment/Plan:      Patient is a 72 year old female with both sides knee complaints.    Patient has the following significant findings with corresponding treatment plan.                Diagnosis 1:  B knee pain  Pain -  hot/cold therapy, manual therapy, splint/taping/bracing/orthotics, self management, education and home program  Decreased ROM/flexibility - manual therapy and therapeutic exercise  Decreased joint mobility - manual therapy and therapeutic exercise  Decreased strength - therapeutic exercise and therapeutic activities  Impaired balance - neuro re-education and therapeutic activities  Decreased proprioception - neuro re-education and therapeutic activities  Inflammation - cold therapy and self management/home program  Impaired gait - gait training  Impaired muscle performance - neuro re-education  Decreased function - therapeutic activities  Impaired posture - neuro re-education    Therapy Evaluation Codes:   1) History comprised of:   Personal factors that impact the plan of care:      Time since onset of symptoms.    Comorbidity factors that impact the plan of care are:      Heart problems.     Medications impacting care: None.  2) Examination of Body Systems comprised of:   Body structures and functions that impact the plan of care:      Knee.   Activity limitations that impact the plan of care are:      Sitting,  Squatting/kneeling, Stairs and Walking.  3) Clinical presentation characteristics are:   Evolving/Changing.  4) Decision-Making    Moderate complexity using standardized patient assessment instrument and/or measureable assessment of functional outcome.  Cumulative Therapy Evaluation is: Moderate complexity.    Previous and current functional limitations:  (See Goal Flow Sheet for this information)    Short term and Long term goals: (See Goal Flow Sheet for this information)     Communication ability:  Patient appears to be able to clearly communicate and understand verbal and written communication and follow directions correctly.  Treatment Explanation - The following has been discussed with the patient:   RX ordered/plan of care  Anticipated outcomes  Possible risks and side effects  This patient would benefit from PT intervention to resume normal activities.   Rehab potential is good.    Frequency:  2 X week, once daily  Duration:  for 3 weeks tapering to 1 X a week over 2 weeks  Discharge Plan:  Achieve all LTG.  Independent in home treatment program.  Reach maximal therapeutic benefit.    Please refer to the daily flowsheet for treatment today, total treatment time and time spent performing 1:1 timed codes.

## 2017-10-13 ENCOUNTER — THERAPY VISIT (OUTPATIENT)
Dept: PHYSICAL THERAPY | Facility: CLINIC | Age: 72
End: 2017-10-13
Payer: COMMERCIAL

## 2017-10-13 DIAGNOSIS — R29.898 LEG WEAKNESS, BILATERAL: ICD-10-CM

## 2017-10-13 PROCEDURE — 97110 THERAPEUTIC EXERCISES: CPT | Mod: GP | Performed by: PHYSICAL THERAPIST

## 2017-10-16 ENCOUNTER — CARE COORDINATION (OUTPATIENT)
Dept: GERIATRIC MEDICINE | Facility: CLINIC | Age: 72
End: 2017-10-16

## 2017-10-20 ENCOUNTER — THERAPY VISIT (OUTPATIENT)
Dept: PHYSICAL THERAPY | Facility: CLINIC | Age: 72
End: 2017-10-20
Payer: COMMERCIAL

## 2017-10-20 DIAGNOSIS — R29.898 LEG WEAKNESS, BILATERAL: ICD-10-CM

## 2017-10-20 PROCEDURE — 97110 THERAPEUTIC EXERCISES: CPT | Mod: GP

## 2017-10-25 ENCOUNTER — CARE COORDINATION (OUTPATIENT)
Dept: GERIATRIC MEDICINE | Facility: CLINIC | Age: 72
End: 2017-10-25

## 2017-10-31 ENCOUNTER — CARE COORDINATION (OUTPATIENT)
Dept: GERIATRIC MEDICINE | Facility: CLINIC | Age: 72
End: 2017-10-31

## 2017-11-03 ENCOUNTER — THERAPY VISIT (OUTPATIENT)
Dept: PHYSICAL THERAPY | Facility: CLINIC | Age: 72
End: 2017-11-03
Payer: COMMERCIAL

## 2017-11-03 DIAGNOSIS — R29.898 LEG WEAKNESS, BILATERAL: ICD-10-CM

## 2017-11-03 PROCEDURE — 97112 NEUROMUSCULAR REEDUCATION: CPT | Mod: GP | Performed by: PHYSICAL THERAPIST

## 2017-11-03 PROCEDURE — 97110 THERAPEUTIC EXERCISES: CPT | Mod: GP | Performed by: PHYSICAL THERAPIST

## 2017-11-06 NOTE — PROGRESS NOTES
10/25/17  CMS confirmed PCA auths with [employer]. 9/01-9/22/17 - 3 hrs/day - Auth # [id number]; 9/23/17-3/31/18 - 3.5 hrs/day - Auth # [id number]. She also called PCA agency with updates.    OTIS Carr  [employer]   [phone] (fax)  [email or web address]

## 2017-11-06 NOTE — PROGRESS NOTES
10/16/17  VM from [facility] at re: Raissa Encarnacion. She verifed auth for the client. Looks okay but one thing is that the client has a previous auth of 9/4/17 end date is 7/31/18 for 3 hrs/day. [id number] auth number. [place] [id number] group number. end it 9/24? wants to know if I can request to end that auth on 9/24/17. they are overlapping. they told her to contact me to have it ended. [phone] ext [phone]  operations.   PC to [facility].  Let her know I will check into this. Let her know I had the dates  from 9/1/17 to 9/22/17 and then 9/23/17 to 8/31/18.  Emailed CMS asking they check with [employer] to get auth straightened out.    OTIS Carr  [employer]   [phone] (fax)  [email or web address]

## 2017-11-06 NOTE — PROGRESS NOTES
9/21/17  L/M for Amanda at [facility]. Asked for call back. PC from Dana from [facility]. They are working with Raissa.  PC to Dana gave new auth dates of 9/23/17 to 8/31/18 for 3.5 hrs/day.  I will also be sending auth from 9/1/17 to 9/22/17 for 3 hrs/day.  The current auth starts 9/4/17 to 7/31/18. Not sure on this, will need to talk to supervisor.    OTIS Carr  [employer]   [phone] (fax)  [email or web address]

## 2017-11-07 ENCOUNTER — THERAPY VISIT (OUTPATIENT)
Dept: PHYSICAL THERAPY | Facility: CLINIC | Age: 72
End: 2017-11-07
Payer: COMMERCIAL

## 2017-11-07 DIAGNOSIS — R29.898 LEG WEAKNESS, BILATERAL: ICD-10-CM

## 2017-11-07 PROCEDURE — 97112 NEUROMUSCULAR REEDUCATION: CPT | Mod: GP | Performed by: PHYSICAL THERAPIST

## 2017-11-07 PROCEDURE — 97110 THERAPEUTIC EXERCISES: CPT | Mod: GP | Performed by: PHYSICAL THERAPIST

## 2017-11-08 NOTE — PROGRESS NOTES
10/31/17  LOWELL Parson at [facility] re: Raissa Encarnacion. She was installed on 10/27/17. wireless unit with mobile 58/mo, 65 activation, $119 for the button. [phone]    OTIS Carr  [employer]   [phone] (fax)  [email or web address]

## 2017-11-10 ENCOUNTER — THERAPY VISIT (OUTPATIENT)
Dept: PHYSICAL THERAPY | Facility: CLINIC | Age: 72
End: 2017-11-10
Payer: COMMERCIAL

## 2017-11-10 DIAGNOSIS — R29.898 LEG WEAKNESS, BILATERAL: ICD-10-CM

## 2017-11-10 PROCEDURE — 97110 THERAPEUTIC EXERCISES: CPT | Mod: GP | Performed by: PHYSICAL THERAPIST

## 2017-11-10 PROCEDURE — 97112 NEUROMUSCULAR REEDUCATION: CPT | Mod: GP | Performed by: PHYSICAL THERAPIST

## 2017-11-14 ENCOUNTER — OFFICE VISIT (OUTPATIENT)
Dept: INTERNAL MEDICINE | Facility: CLINIC | Age: 72
End: 2017-11-14
Payer: COMMERCIAL

## 2017-11-14 VITALS
TEMPERATURE: 97 F | BODY MASS INDEX: 31.02 KG/M2 | OXYGEN SATURATION: 100 % | HEIGHT: 60 IN | HEART RATE: 71 BPM | SYSTOLIC BLOOD PRESSURE: 110 MMHG | WEIGHT: 158 LBS | DIASTOLIC BLOOD PRESSURE: 56 MMHG

## 2017-11-14 DIAGNOSIS — R39.15 URINARY URGENCY: ICD-10-CM

## 2017-11-14 DIAGNOSIS — Z95.2 S/P AORTIC VALVE REPLACEMENT: ICD-10-CM

## 2017-11-14 DIAGNOSIS — M79.10 MUSCLE ACHE: ICD-10-CM

## 2017-11-14 DIAGNOSIS — M81.0 OSTEOPOROSIS, UNSPECIFIED OSTEOPOROSIS TYPE, UNSPECIFIED PATHOLOGICAL FRACTURE PRESENCE: ICD-10-CM

## 2017-11-14 DIAGNOSIS — J45.40 MODERATE PERSISTENT ASTHMA WITHOUT COMPLICATION: ICD-10-CM

## 2017-11-14 DIAGNOSIS — E78.5 HYPERLIPIDEMIA LDL GOAL <130: Primary | ICD-10-CM

## 2017-11-14 DIAGNOSIS — R29.898 BILATERAL LEG WEAKNESS: ICD-10-CM

## 2017-11-14 PROCEDURE — 99215 OFFICE O/P EST HI 40 MIN: CPT | Performed by: INTERNAL MEDICINE

## 2017-11-14 RX ORDER — VITAMIN B COMPLEX
1 TABLET ORAL DAILY
Qty: 30 CAPSULE | Refills: 3 | Status: SHIPPED | OUTPATIENT
Start: 2017-11-14

## 2017-11-14 RX ORDER — PRAVASTATIN SODIUM 20 MG
20 TABLET ORAL DAILY
Qty: 30 TABLET | Refills: 3 | Status: SHIPPED | OUTPATIENT
Start: 2017-11-14 | End: 2018-02-17

## 2017-11-14 RX ORDER — METOPROLOL SUCCINATE 25 MG/1
25 TABLET, EXTENDED RELEASE ORAL DAILY
Qty: 90 TABLET | Refills: 0 | Status: SHIPPED | OUTPATIENT
Start: 2017-11-14 | End: 2018-02-22

## 2017-11-14 RX ORDER — ALENDRONATE SODIUM 70 MG/1
TABLET ORAL
Qty: 12 TABLET | Refills: 1 | Status: SHIPPED | OUTPATIENT
Start: 2017-11-14 | End: 2018-09-28 | Stop reason: SINTOL

## 2017-11-14 RX ORDER — OXYBUTYNIN CHLORIDE 10 MG/1
10 TABLET, EXTENDED RELEASE ORAL DAILY PRN
Qty: 90 TABLET | Refills: 1 | Status: SHIPPED | OUTPATIENT
Start: 2017-11-14 | End: 2019-11-11 | Stop reason: DRUGHIGH

## 2017-11-14 RX ORDER — ALBUTEROL SULFATE 0.83 MG/ML
1 SOLUTION RESPIRATORY (INHALATION) EVERY 6 HOURS PRN
Qty: 75 ML | Refills: 0 | Status: SHIPPED | OUTPATIENT
Start: 2017-11-14 | End: 2019-01-25

## 2017-11-14 NOTE — NURSING NOTE
Chief Complaint   Patient presents with     History of Present Illness     concerns    here with daughter  And interrupter     Initial /56  Pulse 71  Temp 97  F (36.1  C) (Oral)  Ht 5' (1.524 m)  Wt 158 lb (71.7 kg)  SpO2 100%  BMI 30.86 kg/m2 Estimated body mass index is 30.86 kg/(m^2) as calculated from the following:    Height as of this encounter: 5' (1.524 m).    Weight as of this encounter: 158 lb (71.7 kg).  Medication Reconciliation: complete

## 2017-11-14 NOTE — MR AVS SNAPSHOT
After Visit Summary   11/14/2017    Raissa Encarnacion    MRN: [id number]           Patient Information     Date Of Birth          1945        Visit Information        Provider Department      11/14/2017 10:15 AM Hayley Francisco MD; [employer] [facility]        Today's Diagnoses     Hyperlipidemia LDL goal <130    -  1    Muscle ache        Urinary urgency        Moderate persistent asthma without complication        S/P aortic valve replacement Meng, life long ASA,         Bilateral leg weakness        Osteoporosis, unspecified osteoporosis type, unspecified pathological fracture presence          Care Instructions    Plan:  1. Pravastatin 20 mg daily - for cholesterol   2. Add CoQ enzyme 100 mg daily - it might help with the muscles ache  3. Calcium 1200 - 1550 mg daily in divided doses  4. Alendronate 70 mg once a week for osteoporosis   5. Nebulizer machine  6. Please make a lab appointment for fasting labs  In 3 months  7. Please make an appointment few days after the labs to discuss about the results.           Follow-ups after your visit        Additional Services     [facility] PT, HAND, AND CHIROPRACTIC REFERRAL       === This order will print in the [facility] Scheduling  Office ===    Your provider has referred you to: [facility], [phone]     Indication/Reason for Referral: continue PT  Onset of Illness:  Chr leg weakness  Therapy Orders: Physical Therapy per protocol or clinical pathway.  Special Programs None   Special Request:Eval & Treat  Modalities: As indicated  Equipment: As indicated    Additional Comments:       Please be aware that coverage of these services is subject to the terms and limitations of your health insurance plan.  Call member services at your health plan with any benefit or coverage questions.      Please bring the following to your appointment:    >>   Any x-rays, CTs or MRIs which have been performed.   Contact the facility where they were done to arrange for  prior to your scheduled appointment.  Any new CT, MRI or other procedures ordered by your specialist must be performed at a [facility] or coordinated by your clinic's referral office.    >>   List of current medications   >>   This referral request   >>   Any documents/labs given to you for this referral                  Your next 10 appointments already scheduled     Nov 17, 2017  9:25 AM CST   [facility] Extremity with Vivienne DEWAYNE Jaramillo, PT   [facility] ([facility]  )    [address] [facility]  [address] [place] [address]   [phone]            Dec 01, 2017  9:40 AM CST   [facility] Extremity with Vivienne Jaramillo, PT   [facility] ([facility]  )    [address] [address] [address]   [phone]              Future tests that were ordered for you today     Open Future Orders        Priority Expected Expires Ordered    Comprehensive metabolic panel Routine  10/14/2018 11/14/2017    Lipid panel reflex to direct LDL Fasting Routine  10/14/2018 11/14/2017    CK total Routine  10/14/2018 11/14/2017            Who to contact     If you have questions or need follow up information about today's clinic visit or your schedule please contact [facility] directly at [phone].  Normal or non-critical lab and imaging results will be communicated to you by [facility], letter or phone within 4 business days after the clinic has received the results. If you do not hear from us within 7 days, please contact the clinic through [facility] or phone. If you have a critical or abnormal lab result, we will notify you by phone as soon as possible.  Submit refill requests through [employer] or call your pharmacy and they will forward the refill request to us. Please allow 3 business days for your refill to be completed.          Additional Information About Your Visit        [employer] Information     [employer] lets you send  "messages to your doctor, view your test results, renew your prescriptions, schedule appointments and more. To sign up, go to [email or web address] . Click on \"Log in\" on the left side of the screen, which will take you to the Welcome page. Then click on \"Sign up Now\" on the right side of the page.     You will be asked to enter the access code listed below, as well as some personal information. Please follow the directions to create your username and password.     Your access code is: [email or web address]  Expires: 2018 10:56 AM     Your access code will  in 90 days. If you need help or a new code, please call your [place] clinic or [phone].        Care EveryWhere ID     This is your Care EveryWhere ID. This could be used by other organizations to access your [facility] medical records  [id number]        Your Vitals Were     Pulse Temperature Height Pulse Oximetry BMI (Body Mass Index)       71 97  F (36.1  C) (Oral) 5' (1.524 m) 100% 30.86 kg/m2        Blood Pressure from Last 3 Encounters:   17 110/56   17 112/56   16 133/65    Weight from Last 3 Encounters:   17 158 lb (71.7 kg)   17 154 lb 9.6 oz (70.1 kg)   10/24/16 154 lb 9.6 oz (70.1 kg)              We Performed the Following     [facility] PT, HAND, AND CHIROPRACTIC REFERRAL          Today's Medication Changes          These changes are accurate as of: 17 10:56 AM.  If you have any questions, ask your nurse or doctor.               Start taking these medicines.        Dose/Directions    alendronate 70 MG tablet   Commonly known as:  FOSAMAX   Used for:  Osteoporosis, unspecified osteoporosis type, unspecified pathological fracture presence   Started by:  Hayley Francisco MD        Take 1 tablet (70 mg) by mouth with 8oz water every 7 days 30 minutes before breakfast and remain upright during this time.   Quantity:  12 tablet   Refills:  1       CoQ10 100 MG Caps   Used for:  Hyperlipidemia LDL goal " <130, Muscle ache   Started by:  Hayley Francisco MD        Dose:  1 capsule   Take 1 capsule (100 mg) by mouth daily   Quantity:  30 capsule   Refills:  3       order for DME   Used for:  Moderate persistent asthma without complication   Started by:  Hayley Francisco MD        Nebulizer machine   Quantity:  1 Device   Refills:  0       pravastatin 20 MG tablet   Commonly known as:  PRAVACHOL   Used for:  Hyperlipidemia LDL goal <130   Started by:  Hayley Francisco MD        Dose:  20 mg   Take 1 tablet (20 mg) by mouth daily   Quantity:  30 tablet   Refills:  3         These medicines have changed or have updated prescriptions.        Dose/Directions    metoprolol 25 MG 24 hr tablet   Commonly known as:  TOPROL-XL   This may have changed:  See the new instructions.   Used for:  S/P aortic valve replacement   Changed by:  Hayley Francisco MD        Dose:  25 mg   Take 1 tablet (25 mg) by mouth daily   Quantity:  90 tablet   Refills:  0            Where to get your medicines      These medications were sent to [facility] [address] [place] [address] - [address] AT [place] [address] [address]  [address], [facility] [address]     Phone:  [phone]     albuterol (2.5 MG/3ML) 0.083% neb solution    alendronate 70 MG tablet    CoQ10 100 MG Caps    metoprolol 25 MG 24 hr tablet    oxybutynin 10 MG 24 hr tablet    pravastatin 20 MG tablet         Some of these will need a paper prescription and others can be bought over the counter.  Ask your nurse if you have questions.     Bring a paper prescription for each of these medications     order for DME                Primary Care Provider Office Phone # Fax #    Hayley Francisco [phone]       [address]  [facility] [address]        Equal Access to Services     [employer] AH: Roberta Edwards, radha acuna, farnaz medina  josef godoyriya tabaresaan ah. So [facility] [phone].    ATENCIÓN: Si cristinala chelly, tiene a montes de oca disposición servicios gratuitos de asistencia lingüística. Tremaine al [phone].    We comply with applicable federal civil rights laws and [place] laws. We do not discriminate on the basis of race, color, national origin, age, disability, sex, sexual orientation, or gender identity.            Thank you!     Thank you for choosing [facility]  for your care. Our goal is always to provide you with excellent care. Hearing back from our patients is one way we can continue to improve our services. Please take a few minutes to complete the written survey that you may receive in the mail after your visit with us. Thank you!             Your Updated Medication List - Protect others around you: Learn how to safely use, store and throw away your medicines at www.disposemymeds.org.          This list is accurate as of: 11/14/17 10:56 AM.  Always use your most recent med list.                   Brand Name Dispense Instructions for use Diagnosis    * albuterol 108 (90 BASE) MCG/ACT Inhaler    PROAIR HFA/PROVENTIL HFA/VENTOLIN HFA    1 Inhaler    Inhale 2 puffs into the lungs every 6 hours as needed for shortness of breath / dyspnea or wheezing        * albuterol (2.5 MG/3ML) 0.083% neb solution     75 mL    Take 1 vial (2.5 mg) by nebulization every 6 hours as needed for shortness of breath / dyspnea or wheezing    Moderate persistent asthma without complication       alendronate 70 MG tablet    FOSAMAX    12 tablet    Take 1 tablet (70 mg) by mouth with 8oz water every 7 days 30 minutes before breakfast and remain upright during this time.    Osteoporosis, unspecified osteoporosis type, unspecified pathological fracture presence       aspirin EC 81 MG EC tablet      Take 81 mg by mouth        calcium carbonate 500 MG chewable tablet    TUMS     1 tab q6h prn heartburn         carboxymethylcellulose 0.5 % Soln ophthalmic solution    REFRESH PLUS     1 drop        cholecalciferol 1000 UNIT tablet    vitamin D3     Take 1,000 Units by mouth        CoQ10 100 MG Caps     30 capsule    Take 1 capsule (100 mg) by mouth daily    Hyperlipidemia LDL goal <130, Muscle ache       loratadine 10 MG tablet    CLARITIN     Take 10 mg by mouth        metoprolol 25 MG 24 hr tablet    TOPROL-XL    90 tablet    Take 1 tablet (25 mg) by mouth daily    S/P aortic valve replacement       MULTI-VITAMINS Tabs      Take 1 tablet by mouth        order for DME     1 Device    Nebulizer machine    Moderate persistent asthma without complication       oxybutynin 10 MG 24 hr tablet    DITROPAN XL    90 tablet    Take 1 tablet (10 mg) by mouth daily as needed for bladder spasms    Urinary urgency       pantoprazole 40 MG EC tablet    PROTONIX     Take 40 mg by mouth daily (with breakfast)        pravastatin 20 MG tablet    PRAVACHOL    30 tablet    Take 1 tablet (20 mg) by mouth daily    Hyperlipidemia LDL goal <130       * Notice:  This list has 2 medication(s) that are the same as other medications prescribed for you. Read the directions carefully, and ask your doctor or other care provider to review them with you.

## 2017-11-14 NOTE — PROGRESS NOTES
Dr Story's note      Patient's instructions / PLAN:                                                        Plan:  1. Pravastatin 20 mg daily - for cholesterol   2. Add CoQ enzyme 100 mg daily - it might help with the muscles ache  3. Calcium 1200 - 1550 mg daily in divided doses  4. Alendronate 70 mg once a week for osteoporosis   5. Nebulizer machine  6. Please make a lab appointment for fasting labs  In 3 months  7. Please make an appointment few days after the labs to discuss about the results.         ASSESSMENT & PLAN:                                                      (E78.5) Hyperlipidemia LDL goal <130  (primary encounter diagnosis)  Comment: Not controlled   Plan: pravastatin (PRAVACHOL) 20 MG tablet, Coenzyme         Q10 (COQ10) 100 MG CAPS, Comprehensive         metabolic panel, Lipid panel reflex to direct         LDL Fasting, CK total            (M79.1) Muscle ache  Comment:   Plan: Coenzyme Q10 (COQ10) 100 MG CAPS, CK total            (R39.15) Urinary urgency  Comment: Controlled    Plan: oxybutynin (DITROPAN XL) 10 MG 24 hr tablet            (J45.40) Moderate persistent asthma without complication  Comment: Not controlled   Plan: order for DME, albuterol (2.5 MG/3ML) 0.083%         neb solution            (Z95.2) S/P aortic valve replacement Meng, life long ASA,   Comment:   Plan: metoprolol (TOPROL-XL) 25 MG 24 hr tablet            (R29.898) Bilateral leg weakness  Comment: Getting better  Plan: [facility] PT, HAND, AND CHIROPRACTIC REFERRAL            (M81.0) Osteoporosis, unspecified osteoporosis type, unspecified pathological fracture presence  Comment: new dx, new med  Plan: alendronate (FOSAMAX) 70 MG tablet               Chief complaint:                                                        Daughter speaks fluent english  Due to language barrier, an  was present during the history-taking and subsequent discussion (and for part of the physical exam) with this patient.   Follow up  chronic medical problems    They also would like to address: Cholesterol, bone density scan, leg weakness and physical therapy, asthma,      SUBJECTIVE:                                                    Raissa Encarnacion is a 72 year old female who presents to clinic today for the following health issues:  Chief Complaint   Patient presents with     History of Present Illness     concerns      Hyperlipidemia  -- Labs: LDL 86--> 176  -- She used to take lipitor.  -- She has muscle tightness.  Not sure if it was related with Lipitor or not.  The Lipitor was stopped.  She continued with physical therapy and the tightness is little better.  -- The daughter would like to resume statin but different from Lipitor.  We will start pravastatin    Osteoporosis   -- dexa showed osteoporosis.  She even had some fractures in the past.  -- Her vitamin D level is good and she has been taking supplements  -- She does sound like Tums.  Her daughter will look at different forms  -- Fosamax:We discussed about the med, how to take, the advantages and potential side effects. She will check with her dentist before starting this med, to make sure she does not have infection or contraindications. The patient will read also the info from the pharmacy and call back if questions.  We also discussed about avoiding lifting heavy weights, jumping. Weight bearing exercises are beneficial.      Urinary urgency  -- Ditropan 10 mg to help and she would like refills    Leg weakness  -- She has been doing physical therapy.  Daughter sees some progress and she would like to continue it.  -- Referral made    Status post aortic valve replacement  She saw heart doctor Oc t 18 at [facility] and she was told she is well and to come back in a year     Asthma  -- the insurance didn't approve the machine. The  advised the fam to have rx from the doctor and call her back   -- she uses inhaler but when asthma excarb the inhalers do not help. She needs the nebs      Review of Systems:                                                      ROS: negative for fever, chills, cough, wheezes, chest pain, shortness of breath, vomiting, abdominal pain, leg swelling     A 10-point review of systems was obtained.  Those pertinent are above and in the in the Subjective section.  The rest of the systems are negative.           OBJECTIVE:             Physical exam:  Blood pressure 110/56, pulse 71, temperature 97  F (36.1  C), temperature source Oral, height 5' (1.524 m), weight 158 lb (71.7 kg), SpO2 100 %, not currently breastfeeding.   NAD, appears comfortable  Skin: no rashes   Neck: supple, no JVD, No thyroidmegaly. Lymph nodes nonpalpable cervical and supraclavicular.  Chest: clear to auscultation bilaterally, good respiratory effort  Heart: S1 S2, RRR, no mgr appreciated  Abdomen: soft, not tender,   Extremities: no edema,   Neurologic: A, Ox3, no focal signs appreciated    PMHx: reviewed  Past Medical History:   Diagnosis Date     Memory deficit 8/14/2017     S/P aortic valve replacement [facility], life long ASA, Plavix 9/14/2016      PSHx: reviewed  History reviewed. No pertinent surgical history.     Meds: reviewed  Current Outpatient Prescriptions   Medication Sig Dispense Refill     pantoprazole (PROTONIX) 40 MG EC tablet Take 40 mg by mouth daily (with breakfast)       metoprolol (TOPROL-XL) 25 MG 24 hr tablet TAKE 1 TABLET BY MOUTH EVERY DAY 90 tablet 0     oxybutynin (DITROPAN XL) 10 MG 24 hr tablet Take 1 tablet (10 mg) by mouth daily as needed for bladder spasms 30 tablet      albuterol (PROAIR HFA/PROVENTIL HFA/VENTOLIN HFA) 108 (90 BASE) MCG/ACT Inhaler Inhale 2 puffs into the lungs every 6 hours as needed for shortness of breath / dyspnea or wheezing 1 Inhaler 1     aspirin EC 81 MG tablet Take 81 mg by mouth       carboxymethylcellulose (REFRESH PLUS) 0.5 % SOLN 1 drop       cholecalciferol (VITAMIN D3) 1000 UNIT tablet Take 1,000 Units by mouth       loratadine  (CLARITIN) 10 MG tablet Take 10 mg by mouth       Multiple Vitamin (MULTI-VITAMINS) TABS Take 1 tablet by mouth       albuterol (2.5 MG/3ML) 0.083% nebulizer solution Take 1 vial (2.5 mg) by nebulization every 6 hours as needed for shortness of breath / dyspnea or wheezing 75 mL 0     calcium carbonate (TUMS) 500 MG chewable tablet 1 tab q6h prn heartburn         Soc Hx: reviewed  Fam Hx: reviewed    Time spent with the patient  40 min, more than 50% in counseling and coordinating care, Re above medical problems hyperlipidemia, muscle aches, urinary urgency, asthma, leg weakness, osteoporosis, new medications, and answering family questions       Hayley Story MD  Internal Medicine

## 2017-11-14 NOTE — PATIENT INSTRUCTIONS
Plan:  1. Pravastatin 20 mg daily - for cholesterol   2. Add CoQ enzyme 100 mg daily - it might help with the muscles ache  3. Calcium 1200 - 1550 mg daily in divided doses  4. Alendronate 70 mg once a week for osteoporosis   5. Nebulizer machine  6. Please make a lab appointment for fasting labs  In 3 months  7. Please make an appointment few days after the labs to discuss about the results.

## 2017-11-17 ENCOUNTER — THERAPY VISIT (OUTPATIENT)
Dept: PHYSICAL THERAPY | Facility: CLINIC | Age: 72
End: 2017-11-17
Payer: COMMERCIAL

## 2017-11-17 DIAGNOSIS — R29.898 LEG WEAKNESS, BILATERAL: ICD-10-CM

## 2017-11-17 PROCEDURE — 97530 THERAPEUTIC ACTIVITIES: CPT | Mod: GP | Performed by: PHYSICAL THERAPIST

## 2017-11-17 PROCEDURE — 97112 NEUROMUSCULAR REEDUCATION: CPT | Mod: GP | Performed by: PHYSICAL THERAPIST

## 2017-11-17 PROCEDURE — 97110 THERAPEUTIC EXERCISES: CPT | Mod: GP | Performed by: PHYSICAL THERAPIST

## 2017-12-01 ENCOUNTER — THERAPY VISIT (OUTPATIENT)
Dept: PHYSICAL THERAPY | Facility: CLINIC | Age: 72
End: 2017-12-01
Payer: COMMERCIAL

## 2017-12-01 DIAGNOSIS — R29.898 LEG WEAKNESS, BILATERAL: ICD-10-CM

## 2017-12-01 PROCEDURE — 97110 THERAPEUTIC EXERCISES: CPT | Mod: GP | Performed by: PHYSICAL THERAPIST

## 2017-12-01 PROCEDURE — 97112 NEUROMUSCULAR REEDUCATION: CPT | Mod: GP | Performed by: PHYSICAL THERAPIST

## 2017-12-01 PROCEDURE — 97530 THERAPEUTIC ACTIVITIES: CPT | Mod: GP | Performed by: PHYSICAL THERAPIST

## 2017-12-01 NOTE — MR AVS SNAPSHOT
After Visit Summary   12/1/2017    Raissa Encarnacion    MRN: [id number]           Patient Information     Date Of Birth          1945        Visit Information        Provider Department      12/1/2017 9:25 AM Vivienne Jaramillo PT; [employer] [facility]        Today's Diagnoses     Leg weakness, bilateral           Follow-ups after your visit        Your next 10 appointments already scheduled     Dec 15, 2017  9:40 AM CST   [facility] Ext[facility]ity with Brandie Lambert, PT   [facility] ([facility] [place]  )    [address] [address] [address]   [phone]            Dec 22, 2017  2:10 PM CST   [facility] Extremity with Brandie Lambert, PT   [facility] ([facility] [place]  )    [address] [address] [address]   [phone]            Dec 29, 2017  9:40 AM CST   [facility] Ext[facility]ity with Brandie Lambert, PT   [facility] ([facility] [place]  )    [address] [address] [address]   [phone]              Who to contact     If you have questions or need follow up information about today's clinic visit or your schedule please contact [facility] directly at [phone].  Normal or non-critical lab and imaging results will be communicated to you by [employer], letter or phone within 4 business days after the clinic has received the results. If you do not hear from us within 7 days, please contact the clinic through [employer] or phone. If you have a critical or abnormal lab result, we will notify you by phone as soon as possible.  Submit refill requests through [employer] or call your pharmacy and they will forward the refill request to us. Please allow 3 business days for your refill to be completed.          Additional Information About Your Visit        [employer] Information     [employer] lets you send messages to your doctor, view your test results, renew your prescriptions, schedule appointments and more. To sign up, go  "to [email or web address] . Click on \"Log in\" on the left side of the screen, which will take you to the Welcome page. Then click on \"Sign up Now\" on the right side of the page.     You will be asked to enter the access code listed below, as well as some personal information. Please follow the directions to create your username and password.     Your access code is: [email or web address]  Expires: 2018 10:56 AM     Your access code will  in 90 days. If you need help or a new code, please call your [place] clinic or [phone].        Care EveryWhere ID     This is your Care EveryWhere ID. This could be used by other organizations to access your [place] medical records  [id number]         Blood Pressure from Last 3 Encounters:   17 110/56   17 112/56   16 133/65    Weight from Last 3 Encounters:   17 71.7 kg (158 lb)   17 70.1 kg (154 lb 9.6 oz)   10/24/16 70.1 kg (154 lb 9.6 oz)              We Performed the Following     NEUROMUSCULAR RE-EDUCATION     Therapeutic Activities     THERAPEUTIC EXERCISES        Primary Care Provider Office Phone # Fax #    Hayley Saima Francisco [phone]       [address] [facility] [address]        Equal Access to Services     [facility]: Hadii aad ku hadasho Soomaali, waaxda luqadaha, qaybta kaalmada adeegyada, josef maddox haybrittany young . So [facility] [phone].    ATENCIÓN: Si habla español, tiene a montes de oca disposición servicios gratuitos de asistencia lingüística. Llame al [phone].    We comply with applicable federal civil rights laws and [place] laws. We do not discriminate on the basis of race, color, national origin, age, disability, sex, sexual orientation, or gender identity.            Thank you!     Thank you for choosing [facility]   for your care. Our goal is always to provide you with excellent care. Hearing back from our patients is one way we can continue to improve our " services. Please take a few minutes to complete the written survey that you may receive in the mail after your visit with us. Thank you!             Your Updated Medication List - Protect others around you: Learn how to safely use, store and throw away your medicines at www.disposemymeds.org.          This list is accurate as of: 12/1/17 12:51 PM.  Always use your most recent med list.                   Brand Name Dispense Instructions for use Diagnosis    * albuterol 108 (90 BASE) MCG/ACT Inhaler    PROAIR HFA/PROVENTIL HFA/VENTOLIN HFA    1 Inhaler    Inhale 2 puffs into the lungs every 6 hours as needed for shortness of breath / dyspnea or wheezing        * albuterol (2.5 MG/3ML) 0.083% neb solution     75 mL    Take 1 vial (2.5 mg) by nebulization every 6 hours as needed for shortness of breath / dyspnea or wheezing    Moderate persistent asthma without complication       alendronate 70 MG tablet    FOSAMAX    12 tablet    Take 1 tablet (70 mg) by mouth with 8oz water every 7 days 30 minutes before breakfast and remain upright during this time.    Osteoporosis, unspecified osteoporosis type, unspecified pathological fracture presence       aspirin EC 81 MG EC tablet      Take 81 mg by mouth        calcium carbonate 500 MG chewable tablet    TUMS     1 tab q6h prn heartburn        carboxymethylcellulose 0.5 % Soln ophthalmic solution    REFRESH PLUS     1 drop        cholecalciferol 1000 UNIT tablet    vitamin D3     Take 1,000 Units by mouth        CoQ10 100 MG Caps     30 capsule    Take 1 capsule (100 mg) by mouth daily    Hyperlipidemia LDL goal <130, Muscle ache       loratadine 10 MG tablet    CLARITIN     Take 10 mg by mouth        metoprolol 25 MG 24 hr tablet    TOPROL-XL    90 tablet    Take 1 tablet (25 mg) by mouth daily    S/P aortic valve replacement       MULTI-VITAMINS Tabs      Take 1 tablet by mouth        order for DME     1 Device    Nebulizer machine    Moderate persistent asthma without  complication       oxybutynin 10 MG 24 hr tablet    DITROPAN XL    90 tablet    Take 1 tablet (10 mg) by mouth daily as needed for bladder spasms    Urinary urgency       pantoprazole 40 MG EC tablet    PROTONIX     Take 40 mg by mouth daily (with breakfast)        pravastatin 20 MG tablet    PRAVACHOL    30 tablet    Take 1 tablet (20 mg) by mouth daily    Hyperlipidemia LDL goal <130       * Notice:  This list has 2 medication(s) that are the same as other medications prescribed for you. Read the directions carefully, and ask your doctor or other care provider to review them with you.

## 2017-12-01 NOTE — PROGRESS NOTES
Subjective:    HPI                    Objective:    System    Physical Exam    General     ROS    Assessment/Plan:      DISCHARGE REPORT    Progress reporting period is to 12/1/17.       SUBJECTIVE  Pt has been compliant with HEP. She is still having some pain over the front of the L thigh. She has been walking more at home up to 25-30 minutes.     Changes in function:  Yes (See Goal flowsheet attached for changes in current functional level)  Adverse reaction to treatment or activity: None    OBJECTIVE  30 seconds sit to stand: 7 reps   TTP: L medial quad     ASSESSMENT/PLAN  Updated problem list and treatment plan: Diagnosis 1:  Generalized muscle weakness  Pain -  home program  Decreased strength - home program  Impaired balance - home program  STG/LTGs have been met or progress has been made towards goals:  Yes (See Goal flow sheet completed today.)  Assessment of Progress: The patient's condition is improving.  The patient has met all of their long term goals.  Self Management Plans:  Patient has been instructed in a home treatment program.  Patient is independent in a home treatment program.  Patient  has been instructed in self management of symptoms.  I have re-evaluated this patient and find that the nature, scope, duration and intensity of the therapy is appropriate for the medical condition of the patient.  Raissa continues to require the following intervention to meet STG and LTG's:  PT intervention is no longer required to meet STG/LTG.    Recommendations:  This patient is ready to be discharged from therapy and continue their home treatment program.    Please refer to the daily flowsheet for treatment today, total treatment time and time spent performing 1:1 timed codes.

## 2017-12-15 ENCOUNTER — THERAPY VISIT (OUTPATIENT)
Dept: PHYSICAL THERAPY | Facility: CLINIC | Age: 72
End: 2017-12-15
Payer: COMMERCIAL

## 2017-12-15 DIAGNOSIS — R29.898 LEG WEAKNESS, BILATERAL: ICD-10-CM

## 2017-12-15 PROCEDURE — 97140 MANUAL THERAPY 1/> REGIONS: CPT | Mod: GP | Performed by: PHYSICAL THERAPIST

## 2017-12-15 PROCEDURE — 97110 THERAPEUTIC EXERCISES: CPT | Mod: GP | Performed by: PHYSICAL THERAPIST

## 2017-12-29 ENCOUNTER — THERAPY VISIT (OUTPATIENT)
Dept: PHYSICAL THERAPY | Facility: CLINIC | Age: 72
End: 2017-12-29
Payer: COMMERCIAL

## 2017-12-29 DIAGNOSIS — R29.898 LEG WEAKNESS, BILATERAL: ICD-10-CM

## 2017-12-29 PROCEDURE — 97140 MANUAL THERAPY 1/> REGIONS: CPT | Mod: GP | Performed by: PHYSICAL THERAPIST

## 2017-12-29 PROCEDURE — 97110 THERAPEUTIC EXERCISES: CPT | Mod: GP | Performed by: PHYSICAL THERAPIST

## 2017-12-29 NOTE — PROGRESS NOTES
Subjective:  HPI                    Objective:  System    Physical Exam    General     ROS    Assessment/Plan:    PROGRESS  REPORT    Progress reporting period is from 10/9/17  to 12/29/17.       SUBJECTIVE  Subjective changes noted by patient: Pt reports her pain goes down her legs less often and she is able to control this with her exercises.    Current pain level is 5/10 in her back only.  Previous pain level was 7/10.   Changes in function:  Yes (See Goal flowsheet attached for changes in current functional level)  Adverse reaction to treatment or activity: None    OBJECTIVE  Changes noted in objective findings:  Yes, Objective: FIS to ankles, EIS with min restriction, Pain free and full motion B SB. LE strength 5/5 in bilateral hip flexion, knee extension, knee flexion, and plantar flexion. Dorsiflexion 5/5 on R and 4/5 on L. Cording and moderate adhesions at incision at anterior chest. Tender to touch with no apparent redness, warmth, or signs of infection. Increased mobility of skin surrounding incision since last visit.    ASSESSMENT/PLAN  Updated problem list and treatment plan: Diagnosis 1:  Lower extremity weakness3  Pain -  hot/cold therapy, US, electric stimulation, mechanical traction, manual therapy, splint/taping/bracing/orthotics, education, directional preference exercise and home program  Decreased ROM/flexibility - manual therapy and therapeutic exercise  Decreased strength - therapeutic exercise and therapeutic activities  Impaired gait - gait training  Impaired muscle performance - neuro re-education  Decreased function - therapeutic activities  STG/LTGs have been met or progress has been made towards goals:  Yes (See Goal flow sheet completed today.)  Assessment of Progress: The patient's condition is improving.  Self Management Plans:  Patient has been instructed in a home treatment program.  I have re-evaluated this patient and find that the nature, scope, duration and intensity of the  therapy is appropriate for the medical condition of the patient.  Raissa continues to require the following intervention to meet STG and LTG's:  PT    Recommendations:  This patient would benefit from continued therapy.     Frequency:  1 X week, once daily  Duration:  For 6 weeks          Please refer to the daily flowsheet for treatment today, total treatment time and time spent performing 1:1 timed codes.

## 2018-01-26 ENCOUNTER — THERAPY VISIT (OUTPATIENT)
Dept: PHYSICAL THERAPY | Facility: CLINIC | Age: 73
End: 2018-01-26
Payer: COMMERCIAL

## 2018-01-26 DIAGNOSIS — R29.898 LEG WEAKNESS, BILATERAL: ICD-10-CM

## 2018-01-26 PROCEDURE — 97112 NEUROMUSCULAR REEDUCATION: CPT | Mod: GP | Performed by: PHYSICAL THERAPIST

## 2018-01-26 PROCEDURE — 97110 THERAPEUTIC EXERCISES: CPT | Mod: GP | Performed by: PHYSICAL THERAPIST

## 2018-02-09 ENCOUNTER — THERAPY VISIT (OUTPATIENT)
Dept: PHYSICAL THERAPY | Facility: CLINIC | Age: 73
End: 2018-02-09
Payer: COMMERCIAL

## 2018-02-09 DIAGNOSIS — R29.898 LEG WEAKNESS, BILATERAL: ICD-10-CM

## 2018-02-09 PROCEDURE — 97110 THERAPEUTIC EXERCISES: CPT | Mod: GP | Performed by: PHYSICAL THERAPIST

## 2018-02-09 PROCEDURE — 97112 NEUROMUSCULAR REEDUCATION: CPT | Mod: GP | Performed by: PHYSICAL THERAPIST

## 2018-02-16 ENCOUNTER — THERAPY VISIT (OUTPATIENT)
Dept: PHYSICAL THERAPY | Facility: CLINIC | Age: 73
End: 2018-02-16
Payer: COMMERCIAL

## 2018-02-16 DIAGNOSIS — R29.898 LEG WEAKNESS, BILATERAL: ICD-10-CM

## 2018-02-16 PROCEDURE — 97112 NEUROMUSCULAR REEDUCATION: CPT | Mod: GP | Performed by: PHYSICAL THERAPIST

## 2018-02-16 PROCEDURE — 97110 THERAPEUTIC EXERCISES: CPT | Mod: GP | Performed by: PHYSICAL THERAPIST

## 2018-02-17 ENCOUNTER — APPOINTMENT (OUTPATIENT)
Dept: GENERAL RADIOLOGY | Facility: CLINIC | Age: 73
End: 2018-02-17
Attending: EMERGENCY MEDICINE
Payer: COMMERCIAL

## 2018-02-17 ENCOUNTER — APPOINTMENT (OUTPATIENT)
Dept: CT IMAGING | Facility: CLINIC | Age: 73
End: 2018-02-17
Attending: EMERGENCY MEDICINE
Payer: COMMERCIAL

## 2018-02-17 ENCOUNTER — HOSPITAL ENCOUNTER (EMERGENCY)
Facility: CLINIC | Age: 73
Discharge: HOME | End: 2018-02-17
Attending: EMERGENCY MEDICINE | Admitting: EMERGENCY MEDICINE
Payer: COMMERCIAL

## 2018-02-17 VITALS
OXYGEN SATURATION: 99 % | RESPIRATION RATE: 19 BRPM | DIASTOLIC BLOOD PRESSURE: 62 MMHG | HEART RATE: 54 BPM | SYSTOLIC BLOOD PRESSURE: 137 MMHG

## 2018-02-17 DIAGNOSIS — R10.84 ABDOMINAL PAIN, GENERALIZED: ICD-10-CM

## 2018-02-17 DIAGNOSIS — N39.0 URINARY TRACT INFECTION WITHOUT HEMATURIA, SITE UNSPECIFIED: ICD-10-CM

## 2018-02-17 DIAGNOSIS — R00.2 PALPITATIONS: ICD-10-CM

## 2018-02-17 LAB
ALBUMIN SERPL-MCNC: 3.9 G/DL (ref 3.4–5)
ALBUMIN UR-MCNC: NEGATIVE MG/DL
ALP SERPL-CCNC: 80 U/L (ref 40–150)
ALT SERPL W P-5'-P-CCNC: 17 U/L (ref 0–50)
ANION GAP SERPL CALCULATED.3IONS-SCNC: 7 MMOL/L (ref 3–14)
APPEARANCE UR: CLEAR
AST SERPL W P-5'-P-CCNC: 16 U/L (ref 0–45)
BASOPHILS # BLD AUTO: 0.1 10E9/L (ref 0–0.2)
BASOPHILS NFR BLD AUTO: 1.2 %
BILIRUB SERPL-MCNC: 0.6 MG/DL (ref 0.2–1.3)
BILIRUB UR QL STRIP: NEGATIVE
BUN SERPL-MCNC: 9 MG/DL (ref 7–30)
CALCIUM SERPL-MCNC: 8.9 MG/DL (ref 8.5–10.1)
CHLORIDE SERPL-SCNC: 101 MMOL/L (ref 94–109)
CO2 SERPL-SCNC: 28 MMOL/L (ref 20–32)
COLOR UR AUTO: ABNORMAL
CREAT SERPL-MCNC: 1.01 MG/DL (ref 0.52–1.04)
DIFFERENTIAL METHOD BLD: NORMAL
EOSINOPHIL # BLD AUTO: 0.3 10E9/L (ref 0–0.7)
EOSINOPHIL NFR BLD AUTO: 3.8 %
ERYTHROCYTE [DISTWIDTH] IN BLOOD BY AUTOMATED COUNT: 12.9 % (ref 10–15)
GFR SERPL CREATININE-BSD FRML MDRD: 54 ML/MIN/1.7M2
GLUCOSE SERPL-MCNC: 101 MG/DL (ref 70–99)
GLUCOSE UR STRIP-MCNC: NEGATIVE MG/DL
HCT VFR BLD AUTO: 36.7 % (ref 35–47)
HGB BLD-MCNC: 12.2 G/DL (ref 11.7–15.7)
HGB UR QL STRIP: ABNORMAL
IMM GRANULOCYTES # BLD: 0 10E9/L (ref 0–0.4)
IMM GRANULOCYTES NFR BLD: 0.2 %
KETONES UR STRIP-MCNC: NEGATIVE MG/DL
LACTATE BLD-SCNC: 1 MMOL/L (ref 0.7–2)
LEUKOCYTE ESTERASE UR QL STRIP: ABNORMAL
LIPASE SERPL-CCNC: 86 U/L (ref 73–393)
LYMPHOCYTES # BLD AUTO: 2 10E9/L (ref 0.8–5.3)
LYMPHOCYTES NFR BLD AUTO: 24.9 %
MCH RBC QN AUTO: 29.1 PG (ref 26.5–33)
MCHC RBC AUTO-ENTMCNC: 33.2 G/DL (ref 31.5–36.5)
MCV RBC AUTO: 88 FL (ref 78–100)
MONOCYTES # BLD AUTO: 0.8 10E9/L (ref 0–1.3)
MONOCYTES NFR BLD AUTO: 9.1 %
NEUTROPHILS # BLD AUTO: 5 10E9/L (ref 1.6–8.3)
NEUTROPHILS NFR BLD AUTO: 60.8 %
NITRATE UR QL: NEGATIVE
NRBC # BLD AUTO: 0 10*3/UL
NRBC BLD AUTO-RTO: 0 /100
PH UR STRIP: 8 PH (ref 5–7)
PLATELET # BLD AUTO: 160 10E9/L (ref 150–450)
POTASSIUM SERPL-SCNC: 4 MMOL/L (ref 3.4–5.3)
PROT SERPL-MCNC: 7.3 G/DL (ref 6.8–8.8)
RBC # BLD AUTO: 4.19 10E12/L (ref 3.8–5.2)
RBC #/AREA URNS AUTO: <1 /HPF (ref 0–2)
SODIUM SERPL-SCNC: 136 MMOL/L (ref 133–144)
SOURCE: ABNORMAL
SP GR UR STRIP: 1 (ref 1–1.03)
TROPONIN I SERPL-MCNC: <0.015 UG/L (ref 0–0.04)
UROBILINOGEN UR STRIP-MCNC: 0 MG/DL (ref 0–2)
WBC # BLD AUTO: 8.2 10E9/L (ref 4–11)
WBC #/AREA URNS AUTO: 12 /HPF (ref 0–2)

## 2018-02-17 PROCEDURE — 85025 COMPLETE CBC W/AUTO DIFF WBC: CPT | Performed by: EMERGENCY MEDICINE

## 2018-02-17 PROCEDURE — 25000128 H RX IP 250 OP 636: Performed by: EMERGENCY MEDICINE

## 2018-02-17 PROCEDURE — 80053 COMPREHEN METABOLIC PANEL: CPT | Performed by: EMERGENCY MEDICINE

## 2018-02-17 PROCEDURE — 81001 URINALYSIS AUTO W/SCOPE: CPT | Performed by: EMERGENCY MEDICINE

## 2018-02-17 PROCEDURE — 84484 ASSAY OF TROPONIN QUANT: CPT | Performed by: EMERGENCY MEDICINE

## 2018-02-17 PROCEDURE — 93005 ELECTROCARDIOGRAM TRACING: CPT

## 2018-02-17 PROCEDURE — 83690 ASSAY OF LIPASE: CPT | Performed by: EMERGENCY MEDICINE

## 2018-02-17 PROCEDURE — 96360 HYDRATION IV INFUSION INIT: CPT | Mod: 59

## 2018-02-17 PROCEDURE — 74177 CT ABD & PELVIS W/CONTRAST: CPT

## 2018-02-17 PROCEDURE — 83605 ASSAY OF LACTIC ACID: CPT | Performed by: EMERGENCY MEDICINE

## 2018-02-17 PROCEDURE — 71046 X-RAY EXAM CHEST 2 VIEWS: CPT

## 2018-02-17 PROCEDURE — 99285 EMERGENCY DEPT VISIT HI MDM: CPT | Mod: 25

## 2018-02-17 RX ORDER — IOPAMIDOL 755 MG/ML
500 INJECTION, SOLUTION INTRAVASCULAR ONCE
Status: COMPLETED | OUTPATIENT
Start: 2018-02-17 | End: 2018-02-17

## 2018-02-17 RX ORDER — SULFAMETHOXAZOLE/TRIMETHOPRIM 800-160 MG
1 TABLET ORAL 2 TIMES DAILY
Qty: 6 TABLET | Refills: 0 | Status: SHIPPED | OUTPATIENT
Start: 2018-02-17 | End: 2018-02-20

## 2018-02-17 RX ORDER — SODIUM CHLORIDE 9 MG/ML
1000 INJECTION, SOLUTION INTRAVENOUS CONTINUOUS
Status: DISCONTINUED | OUTPATIENT
Start: 2018-02-17 | End: 2018-02-17 | Stop reason: HOSPADM

## 2018-02-17 RX ADMIN — IOPAMIDOL 80 ML: 755 INJECTION, SOLUTION INTRAVENOUS at 18:36

## 2018-02-17 RX ADMIN — SODIUM CHLORIDE 1000 ML: 9 INJECTION, SOLUTION INTRAVENOUS at 17:37

## 2018-02-17 RX ADMIN — SODIUM CHLORIDE 60 ML: 9 INJECTION, SOLUTION INTRAVENOUS at 18:36

## 2018-02-17 ASSESSMENT — ENCOUNTER SYMPTOMS
RHINORRHEA: 0
PALPITATIONS: 1
SORE THROAT: 0
COUGH: 0
DIARRHEA: 0
CHILLS: 1
NAUSEA: 0
ABDOMINAL PAIN: 1
DIAPHORESIS: 0
VOMITING: 0
SHORTNESS OF BREATH: 0
FEVER: 0

## 2018-02-17 NOTE — ED PROVIDER NOTES
History     Chief Complaint:  Palpitations    HPI   History is limited due to a language barrier. , the patient's daughter, was used.    Raissa Encarnacion is a 73 year old female with a history of hyperlipidemia and memory deficit who presents with family to the ED for evaluation of palpitations. The patient had aortic valve surgery 9/1/16 and at her 1 year check up last year, her doctor said everything looked fine. 3 days ago she started having episodes of palpitations. Her daughter tried to get her an appointment with her primary doctor but they are unable to get her in until the end of March and so they present here for evaluation. The patient reports that her first episode of palpitations occurred 3 days ago while at home. She reports not doing any strenuous activity during the time of onset. She reports her heart beating very fast for about 10 minutes with a gradual onset of generalized weakness. Since the time of onset, she reports having 5 episodes since. The episodes last just minutes rather than hours. She denies having any chest pain, loss of consciousness, or fainting with the palpitations. She also reports an onset of abdominal pain 3 days ago, with the most tender area being the epigastric. She reports occasionally having constipation but had a bowel movement last night. She notes feeling bloated yesterday and that this has been intermittent over the past month. She denies any previous abdominal surgeries. The patient reports feeling better today with less severe abdominal pain and no episodes of palpitations. However, she has been in bed all day feeling cold and weak. She denies any recent cold, cough, urinary problems, nausea, vomiting, diarrhea, fevers, or any other symptoms. Of note, the daughter also reports the patient having black spots on her skin, mostly on her back, shoulders, and thighs for over a month and is wondering if she can have some cream to apply on the  skin.    Allergies:  No known drug allergies    Medications:    Atorvastatin Calcium  Oxybutynin  Coenzyme Q10  Albuterol  Metoprolol  Fosamax  Protonix  Aspirin  Tums  Refresh plus  Vitamin D3  Claritin  Multi-vitamins     Past Medical History:    Memory deficit  Uncomplicated asthma  Vitamin D deficiency  Hyperlipidemia  Leg weakness, bilateral    Past Surgical History:    Aortic valve replacement    Family History:    History reviewed. No pertinent family history.     Social History:  Smoking status: Never  Alcohol use: No  Marital Status: Single      Review of Systems   Constitutional: Positive for chills. Negative for diaphoresis and fever.   HENT: Negative for congestion, rhinorrhea and sore throat.    Respiratory: Negative for cough and shortness of breath.    Cardiovascular: Positive for palpitations. Negative for chest pain and leg swelling.   Gastrointestinal: Positive for abdominal pain. Negative for diarrhea, nausea and vomiting.   Genitourinary: Negative.    Skin: Positive for rash.   Neurological: Negative for syncope.   All other systems reviewed and are negative.    Physical Exam   Patient Vitals for the past 24 hrs:   BP Pulse Heart Rate Resp SpO2   02/17/18 2009 - - 52 19 99 %   02/17/18 1930 137/62 - 58 21 100 %   02/17/18 1915 139/61 - 56 21 100 %   02/17/18 1800 160/84 - 56 12 100 %   02/17/18 1745 154/82 - 51 18 99 %   02/17/18 1714 147/71 54 - 16 100 %     Physical Exam  Constitutional:  Appears well-developed and well-nourished. Alert. Conversant to her daugher, who interprets. Non toxic.  HENT:   Head: Atraumatic.   Nose: Nose normal.  Mouth/Throat: Oral mucosa is clear and moist. no trismus. Pharynx normal. Tonsils symmetric. No tonsillar enlargement, erythema, or exudate.  Eyes: Conjunctivae normal. EOM normal. Pupils equal, round, and reactive to light. No scleral icterus.   Neck: Normal range of motion. Neck supple. No tracheal deviation present. No JVD  Cardiovascular: Normal rate,  regular rhythm. No gallop. No friction rub. Systolic clicking murmur heard. Symmetric radial artery pulses   Pulmonary/Chest: Effort normal. No stridor. No respiratory distress. No wheezes. No rales. No rhonchi . No tenderness.   Abdominal: Soft. Bowel sounds normal. No distension. No mass. Epigastric/periumbiluical tenderness. No rebound. No guarding. No CVA tenderness.  Musculoskeletal:   RUE: Normal range of motion. No tenderness. No deformity  LUE: Normal range of motion. No tenderness. No deformity  RLE: Normal range of motion. No edema. No tenderness. No deformity  LLE: Normal range of motion. No edema. No tenderness. No deformity  Lymph: No cervical adenopathy.   Neurological: Alert and oriented to person, place, and time. Normal strength. CN II-VII intact. No sensory deficit. GCS eye subscore is 4. GCS verbal subscore is 5. GCS motor subscore is 6. Normal coordination   Skin: Skin is warm and dry. No rash noted. No pallor. Normal capillary refill.  Psychiatric:  Normal mood. Normal affect.     Emergency Department Course   ECG (17:03:58):  Rate 57 bpm. DE interval 184. QRS duration 92. QT/QTc 434/422. P-R-T axes 0 -54 31. Sinus bradycardia. Left axis deviation. Abnormal ECG. Interpreted at 1700 by Kwaku Romero MD.    Imaging:  Radiographic findings were communicated with the patient and family who voiced understanding of the findings.    CT-scan Abdomen/Pelvis w/ contrast:  IMPRESSION: No CT evidence of an acute inflammatory process in the  abdomen or pelvis.   Preliminary result per radiology.     X-ray Chest, 2 views:  IMPRESSION: Prosthetic cardiac valve is noted, unchanged in  orientation compared to 11/28/2016. Right atrial prominence is noted,  increased since 2016. The lungs appear clear. No apparent vascular  congestion or pleural effusion.   Result per radiology.     Laboratory:  UA: Moderate blood, Moderate leukocyte esterase, pH 8.0 (H), WBC 12 (H), o/w Negative  CBC: WNL (WBC 8.2, HGB  12.2, )   CMP: Glucose 101 (H) o/w WNL (Creatinine 1.01)  Lipase: 86  Lactic Acid: 1.0  1733 - Troponin: <0.015    Interventions:  1737: NS 1L IV Bolus  1836: NS 60 mL IV Bolus    Emergency Department Course:  Past medical records, nursing notes, and vitals reviewed.  1708: I performed an exam of the patient and obtained history, as documented above. GCS 15.    IV inserted and blood drawn. The patient was placed on cardiac monitoring and pulse oximetry.    The patient was sent for a CT-scan and x-ray while in the emergency department, findings above.    2052: I rechecked the patient. Findings and plan explained to the Patient and daughter. Patient discharged home with instructions regarding supportive care, medications, and reasons to return. The importance of close follow-up was reviewed.     Impression & Plan      Medical Decision Making:  Raissa Encarnacion is a 73 year old female who presents for evaluation of palpitations.  Initial ECG shows normal sinus rhythm and no dysrhythmogenic abnormality such as WPW, prolonged QT, Brugada syndrome, and no ischemia. Cardiac monitor while the patient here in the ER showed no dysrhythmia or ectopy. A broad differential diagnosis was considered including SVT, Atrial fibrillation, ventricular arrhythmia, thyroid disease, acute electrolyte abnormality,  drugs/medications, caffeine intake or other stimulants, medication side effect, anemia, heart disease, PE, among others. The workup and exam here in ED shows not specific cause of the patient's palpitations, and, although she has a history of aortic stenosis, she is status post valve replacement and presumably has normal function since surgery, according to her daughter.  Urinalysis does show equivocal evidence for UTI.  She has been having some abdominal pain, although other workup and CT imaging for that are negative.  Clinical history would suggest constipation is the most likely cause for her pain.  No urinary symptoms.   No evidence for urosepsis.      Discussed that we could admit for cardiac monitoring.  They would prefer to go home.  Will arrange an outpatient Holter monitor and recommend follow up with her cardiologist.  Will also treat with a course of oral antibiotics for presumed UTI, although this could be an incidental finding of pyuria.    Critical Care time:  none    Diagnosis:    ICD-10-CM    1. Palpitations R00.2 Zio Patch Holter   2. Urinary tract infection without hematuria, site unspecified N39.0    3. Abdominal pain, generalized R10.84     resolved       Disposition:  discharged to home    Discharge Medications:  New Prescriptions    SULFAMETHOXAZOLE-TRIMETHOPRIM (BACTRIM DS) 800-160 MG PER TABLET    Take 1 tablet by mouth 2 times daily for 3 days         Mirta Hinds  2/17/2018   [facility] EMERGENCY DEPARTMENT  Mirta WU, am serving as a scribe at 5:08 PM on 2/17/2018 to document services personally performed by Kwaku Romero MD based on my observations and the provider's statements to me.        Kwaku Romero MD  02/20/18 0084

## 2018-02-17 NOTE — ED AVS SNAPSHOT
[facility] Emergency Department    [address]    [facility] [address]    Phone:  [phone]    Fax:  [phone]                                       Raissa Encarnacion   MRN: [id number]    Department:  [facility] Emergency Department   Date of Visit:  2/17/2018           After Visit Summary Signature Page     I have received my discharge instructions, and my questions have been answered. I have discussed any challenges I see with this plan with the nurse or doctor.    ..........................................................................................................................................  Patient/Patient Representative Signature      ..........................................................................................................................................  Patient Representative Print Name and Relationship to Patient    ..................................................               ................................................  Date                                            Time    ..........................................................................................................................................  Reviewed by Signature/Title    ...................................................              ..............................................  Date                                                            Time

## 2018-02-17 NOTE — ED AVS SNAPSHOT
[facility] Emergency Department    [address]    [facility] [address]    Phone:  [phone]    Fax:  [phone]                                       Raissa Encarnacion   MRN: [id number]    Department:  [facility] Emergency Department   Date of Visit:  2/17/2018           Patient Information     Date Of Birth          1945        Your diagnoses for this visit were:     Palpitations     Urinary tract infection without hematuria, site unspecified     Abdominal pain, generalized resolved       You were seen by Kwaku Romero MD.      Follow-up Information     Follow up with Hayley Francisco MD In 4 days.    Specialty:  Internal Medicine    Contact information:    [address]  [facility] [address]  [phone]        Future Appointments        Provider Department Dept Phone Center    2/23/2018 10:00 AM Yevgeniy Borja, PT [facility] [phone] [facility]ERE[facility]      24 Hour Appointment Hotline       To make an appointment at any [place] clinic, call [phone] ([phone]). If you don't have a family doctor or clinic, we will help you find one. [place] clinics are conveniently located to serve the needs of you and your family.          ED Discharge Orders     Zio Patch Holter                    Review of your medicines      START taking        Dose / Directions Last dose taken    sulfamethoxazole-trimethoprim 800-160 MG per tablet   Commonly known as:  BACTRIM DS   Dose:  1 tablet   Quantity:  6 tablet        Take 1 tablet by mouth 2 times daily for 3 days   Refills:  0          Our records show that you are taking the medicines listed below. If these are incorrect, please call your family doctor or clinic.        Dose / Directions Last dose taken    * albuterol 108 (90 BASE) MCG/ACT Inhaler   Commonly known as:  PROAIR HFA/PROVENTIL HFA/VENTOLIN HFA   Dose:  2 puff   Quantity:  1 Inhaler        Inhale 2 puffs into the  lungs every 6 hours as needed for shortness of breath / dyspnea or wheezing   Refills:  1        * albuterol (2.5 MG/3ML) 0.083% neb solution   Dose:  1 vial   Quantity:  75 mL        Take 1 vial (2.5 mg) by nebulization every 6 hours as needed for shortness of breath / dyspnea or wheezing   Refills:  0        alendronate 70 MG tablet   Commonly known as:  FOSAMAX   Quantity:  12 tablet        Take 1 tablet (70 mg) by mouth with 8oz water every 7 days 30 minutes before breakfast and remain upright during this time.   Refills:  1        aspirin EC 81 MG EC tablet   Dose:  81 mg        Take 81 mg by mouth   Refills:  0        ATORVASTATIN CALCIUM PO        Refills:  0        calcium carbonate 500 MG chewable tablet   Commonly known as:  TUMS        1 tab q6h prn heartburn   Refills:  0        carboxymethylcellulose 0.5 % Soln ophthalmic solution   Commonly known as:  REFRESH PLUS   Dose:  1 drop        1 drop   Refills:  0        cholecalciferol 1000 UNIT tablet   Commonly known as:  vitamin D3   Dose:  1000 Units        Take 1,000 Units by mouth   Refills:  0        CoQ10 100 MG Caps   Dose:  1 capsule   Quantity:  30 capsule        Take 1 capsule (100 mg) by mouth daily   Refills:  3        loratadine 10 MG tablet   Commonly known as:  CLARITIN   Dose:  10 mg        Take 10 mg by mouth   Refills:  0        metoprolol succinate 25 MG 24 hr tablet   Commonly known as:  TOPROL-XL   Dose:  25 mg   Quantity:  90 tablet        Take 1 tablet (25 mg) by mouth daily   Refills:  0        MULTI-VITAMINS Tabs   Dose:  1 tablet        Take 1 tablet by mouth   Refills:  0        order for DME   Quantity:  1 Device        Nebulizer machine   Refills:  0        oxybutynin 10 MG 24 hr tablet   Commonly known as:  DITROPAN XL   Dose:  10 mg   Quantity:  90 tablet        Take 1 tablet (10 mg) by mouth daily as needed for bladder spasms   Refills:  1        pantoprazole 40 MG EC tablet   Commonly known as:  PROTONIX   Dose:  40 mg         Take 40 mg by mouth daily (with breakfast)   Refills:  0        * Notice:  This list has 2 medication(s) that are the same as other medications prescribed for you. Read the directions carefully, and ask your doctor or other care provider to review them with you.            Prescriptions were sent or printed at these locations (1 Prescription)                   Other Prescriptions                Printed at Department/Unit printer (1 of 1)         sulfamethoxazole-trimethoprim (BACTRIM DS) 800-160 MG per tablet                Procedures and tests performed during your visit     CBC with platelets differential    CT Abdomen Pelvis w Contrast    Cardiac Continuous Monitoring    Comprehensive metabolic panel    EKG 12 lead    Give 20 ounces of water 15 minutes before CT of abdomen    Lactic acid whole blood    Lipase    Peripheral IV catheter    Troponin I    UA with Microscopic    XR Chest 2 Views      Orders Needing Specimen Collection     None      Pending Results     Date and Time Order Name Status Description    2/17/2018 1729 CT Abdomen Pelvis w Contrast Preliminary     2/17/2018 1726 XR Chest 2 Views Preliminary     2/17/2018 1706 EKG 12 lead Preliminary             Pending Culture Results     No orders found from 2/15/2018 to 2/18/2018.            Pending Results Instructions     If you had any lab results that were not finalized at the time of your Discharge, you can call the ED Lab Result RN at [phone]. You will be contacted by this team for any positive Lab results or changes in treatment. The nurses are available 7 days a week from 10A to 6:30P.  You can leave a message 24 hours per day and they will return your call.        Test Results From Your Hospital Stay        2/17/2018  5:53 PM      Component Results     Component Value Ref Range & Units Status    WBC 8.2 4.0 - 11.0 10e9/L Final    RBC Count 4.19 3.8 - 5.2 10e12/L Final    Hemoglobin 12.2 11.7 - 15.7 g/dL Final    Hematocrit 36.7 35.0 - 47.0  % Final    MCV 88 78 - 100 fl Final    MCH 29.1 26.5 - 33.0 pg Final    MCHC 33.2 31.5 - 36.5 g/dL Final    RDW 12.9 10.0 - 15.0 % Final    Platelet Count 160 150 - 450 10e9/L Final    Diff Method Automated Method  Final    % Neutrophils 60.8 % Final    % Lymphocytes 24.9 % Final    % Monocytes 9.1 % Final    % Eosinophils 3.8 % Final    % Basophils 1.2 % Final    % Immature Granulocytes 0.2 % Final    Nucleated RBCs 0 0 /100 Final    Absolute Neutrophil 5.0 1.6 - 8.3 10e9/L Final    Absolute Lymphocytes 2.0 0.8 - 5.3 10e9/L Final    Absolute Monocytes 0.8 0.0 - 1.3 10e9/L Final    Absolute Eosinophils 0.3 0.0 - 0.7 10e9/L Final    Absolute Basophils 0.1 0.0 - 0.2 10e9/L Final    Abs Immature Granulocytes 0.0 0 - 0.4 10e9/L Final    Absolute Nucleated RBC 0.0  Final         2/17/2018  6:14 PM      Component Results     Component Value Ref Range & Units Status    Sodium 136 133 - 144 mmol/L Final    Potassium 4.0 3.4 - 5.3 mmol/L Final    Chloride 101 94 - 109 mmol/L Final    Carbon Dioxide 28 20 - 32 mmol/L Final    Anion Gap 7 3 - 14 mmol/L Final    Glucose 101 (H) 70 - 99 mg/dL Final    Urea Nitrogen 9 7 - 30 mg/dL Final    Creatinine 1.01 0.52 - 1.04 mg/dL Final    GFR Estimate 54 (L) >60 mL/min/1.7m2 Final    Non  GFR Calc    GFR Estimate If Black 65 >60 mL/min/1.7m2 Final    African American GFR Calc    Calcium 8.9 8.5 - 10.1 mg/dL Final    Bilirubin Total 0.6 0.2 - 1.3 mg/dL Final    Albumin 3.9 3.4 - 5.0 g/dL Final    Protein Total 7.3 6.8 - 8.8 g/dL Final    Alkaline Phosphatase 80 40 - 150 U/L Final    ALT 17 0 - 50 U/L Final    AST 16 0 - 45 U/L Final         2/17/2018  6:14 PM      Component Results     Component Value Ref Range & Units Status    Lipase 86 73 - 393 U/L Final         2/17/2018  5:56 PM      Component Results     Component Value Ref Range & Units Status    Lactic Acid 1.0 0.7 - 2.0 mmol/L Final         2/17/2018  6:14 PM      Component Results     Component Value Ref Range &  Units Status    Troponin I ES <0.015 0.000 - 0.045 ug/L Final    The 99th percentile for upper reference range is 0.045 ug/L.  Troponin values   in the range of 0.045 - 0.120 ug/L may be associated with risks of adverse   clinical events.           2/17/2018  6:57 PM      Narrative     CHEST TWO VIEWS  2/17/2018 6:53 PM     HISTORY: Palpitations.         Impression     IMPRESSION: Prosthetic cardiac valve is noted, unchanged in  orientation compared to 11/28/2016. Right atrial prominence is noted,  increased since 2016. The lungs appear clear. No apparent vascular  congestion or pleural effusion.          2/17/2018  6:39 PM      Component Results     Component Value Ref Range & Units Status    Color Urine Straw  Final    Appearance Urine Clear  Final    Glucose Urine Negative NEG^Negative mg/dL Final    Bilirubin Urine Negative NEG^Negative Final    Ketones Urine Negative NEG^Negative mg/dL Final    Specific Gravity Urine 1.003 1.003 - 1.035 Final    Blood Urine Moderate (A) NEG^Negative Final    pH Urine 8.0 (H) 5.0 - 7.0 pH Final    Protein Albumin Urine Negative NEG^Negative mg/dL Final    Urobilinogen mg/dL 0.0 0.0 - 2.0 mg/dL Final    Nitrite Urine Negative NEG^Negative Final    Leukocyte Esterase Urine Moderate (A) NEG^Negative Final    Source Midstream Urine  Final    WBC Urine 12 (H) 0 - 2 /HPF Final    RBC Urine <1 0 - 2 /HPF Final         2/17/2018  6:52 PM      Narrative     CT ABDOMEN PELVIS WITH CONTRAST 2/17/2018 6:41 PM     HISTORY: Abdominal pain.     FINDINGS: Prominent fecal debris is noted in the colon. There is no  evidence of pericolonic inflammatory stranding. No evidence of bowel  obstruction. No free peritoneal fluid or air. Pelvic contents appear  within normal limits. The liver, spleen, kidneys, adrenal glands,  pancreas, and gallbladder appear within normal limits.        Impression     IMPRESSION: No CT evidence of an acute inflammatory process in the  abdomen or pelvis.                  Clinical Quality Measure: Blood Pressure Screening     Your blood pressure was checked while you were in the emergency department today. The last reading we obtained was  BP: 137/62 . Please read the guidelines below about what these numbers mean and what you should do about them.  If your systolic blood pressure (the top number) is less than 120 and your diastolic blood pressure (the bottom number) is less than 80, then your blood pressure is normal. There is nothing more that you need to do about it.  If your systolic blood pressure (the top number) is 120-139 or your diastolic blood pressure (the bottom number) is 80-89, your blood pressure may be higher than it should be. You should have your blood pressure rechecked within a year by a primary care provider.  If your systolic blood pressure (the top number) is 140 or greater or your diastolic blood pressure (the bottom number) is 90 or greater, you may have high blood pressure. High blood pressure is treatable, but if left untreated over time it can put you at risk for heart attack, stroke, or kidney failure. You should have your blood pressure rechecked by a primary care provider within the next 4 weeks.  If your provider in the emergency department today gave you specific instructions to follow-up with your doctor or provider even sooner than that, you should follow that instruction and not wait for up to 4 weeks for your follow-up visit.        Thank you for choosing [facility]       Thank you for choosing [facility] for your care. Our goal is always to provide you with excellent care. Hearing back from our patients is one way we can continue to improve our services. Please take a few minutes to complete the written survey that you may receive in the mail after you visit with us. Thank you!        [employer] Information     [employer] lets you send messages to your doctor, view your test results, renew your prescriptions, schedule appointments and more. To sign up,  "go to [email or web address] . Click on \"Log in\" on the left side of the screen, which will take you to the Welcome page. Then click on \"Sign up Now\" on the right side of the page.     You will be asked to enter the access code listed below, as well as some personal information. Please follow the directions to create your username and password.     Your access code is: [email or web address]  Expires: 2018  8:59 PM     Your access code will  in 90 days. If you need help or a new code, please call your [facility] clinic or [phone].        Care EveryWhere ID     This is your Care EveryWhere ID. This could be used by other organizations to access your [facility] medical records  [id number]        Equal Access to Services     [employer] : Roberta Edwards, radha acuna, farnaz godoy, josef zavala. So [facility] [phone].    ATENCIÓN: Si habla español, tiene a montes de oca disposición servicios gratuitos de asistencia lingüística. Tremaine al [phone].    We comply with applicable federal civil rights laws and [place] laws. We do not discriminate on the basis of race, color, national origin, age, disability, sex, sexual orientation, or gender identity.            After Visit Summary       This is your record. Keep this with you and show to your community pharmacist(s) and doctor(s) at your next visit.                  "

## 2018-02-17 NOTE — ED NOTES
Pt has been having palpitations for the last 3 days intermittently. At home not doing anything in particular when started. Unsure of how long each episode lasts.  Weakness noted to follow the episode of palpitations. Shortness of breath with palpitations and laying on her right side. Abdominal discomfort and bloating, better today. Alert and oriented. ABC intact.

## 2018-02-18 NOTE — ED NOTES
All patient questions have been answered, no further questions at this time. Patient verbalizes understanding of discharge teaching, medications and the importance of follow up. Discharge done via family member d/t language barrier.

## 2018-02-19 ENCOUNTER — CARE COORDINATION (OUTPATIENT)
Dept: GERIATRIC MEDICINE | Facility: CLINIC | Age: 73
End: 2018-02-19

## 2018-02-19 DIAGNOSIS — J45.40 MODERATE PERSISTENT ASTHMA WITHOUT COMPLICATION: ICD-10-CM

## 2018-02-19 DIAGNOSIS — Z95.2 S/P AORTIC VALVE REPLACEMENT: ICD-10-CM

## 2018-02-19 LAB — INTERPRETATION ECG - MUSE: NORMAL

## 2018-02-19 RX ORDER — METOPROLOL SUCCINATE 25 MG/1
25 TABLET, EXTENDED RELEASE ORAL DAILY
Qty: 90 TABLET | Refills: 0 | Status: CANCELLED | OUTPATIENT
Start: 2018-02-19

## 2018-02-19 NOTE — PROGRESS NOTES
Notified that Raissa was in ER on 2/17/18 for UTI and palpitations. PC to Cecelia, mariann. She reports that her mom is home but is still not feeling well. She is taking antibiotic for UTI.  Cardiology clinic was supposed to call today to get her set up with heart monitor but they didn't call. She will call them tomorrow. She got her mom up and walking today so she isn't just laying around. She doesn't want to do much.   Cecelia will be scheduling a follow up appt with PCP.   Lidya Tinsley Six-Month Telephone Assessment    6 month telephone assessment completed on 2/19/18 with daughterCecelia.    ER visits: Yes -  [facility]  Hospitalizations: No  TCU stays: No  Significant health status changes: just had ED visit for palpitations and UTI. Will get more monitoring done for her heart.  Falls/Injuries: No  ADL/IADL changes: No  Changes in services: No    Caregiver Assessment follow up:  n/a    Goals: See POC in chart for goal progress documentation.    1. Continue to monitor meds  2. Continue to monitor pain  3. No falls. Continue to monitor.    Will see client in 6 months for an annual health risk assessment.   Encouraged client to call CM with any questions or concerns in the meantime.     OTIS Carr  [facility]   [phone] (fax)  [email or web address]

## 2018-02-22 ENCOUNTER — OFFICE VISIT (OUTPATIENT)
Dept: INTERNAL MEDICINE | Facility: CLINIC | Age: 73
End: 2018-02-22
Payer: COMMERCIAL

## 2018-02-22 VITALS
WEIGHT: 157.9 LBS | DIASTOLIC BLOOD PRESSURE: 60 MMHG | BODY MASS INDEX: 31 KG/M2 | HEIGHT: 60 IN | SYSTOLIC BLOOD PRESSURE: 128 MMHG | HEART RATE: 62 BPM | OXYGEN SATURATION: 100 % | TEMPERATURE: 97.4 F

## 2018-02-22 DIAGNOSIS — Z95.2 S/P AORTIC VALVE REPLACEMENT: ICD-10-CM

## 2018-02-22 DIAGNOSIS — E78.5 HYPERLIPIDEMIA LDL GOAL <130: ICD-10-CM

## 2018-02-22 DIAGNOSIS — R10.13 ABDOMINAL PAIN, EPIGASTRIC: ICD-10-CM

## 2018-02-22 DIAGNOSIS — H04.123 DRY EYES: ICD-10-CM

## 2018-02-22 DIAGNOSIS — R31.29 MICROSCOPIC HEMATURIA: ICD-10-CM

## 2018-02-22 DIAGNOSIS — R21 RASH AND NONSPECIFIC SKIN ERUPTION: ICD-10-CM

## 2018-02-22 DIAGNOSIS — J45.20 MILD INTERMITTENT ASTHMA, UNSPECIFIED WHETHER COMPLICATED: ICD-10-CM

## 2018-02-22 DIAGNOSIS — R00.2 PALPITATIONS: Primary | ICD-10-CM

## 2018-02-22 PROCEDURE — 99215 OFFICE O/P EST HI 40 MIN: CPT | Performed by: INTERNAL MEDICINE

## 2018-02-22 RX ORDER — ALBUTEROL SULFATE 0.83 MG/ML
1 SOLUTION RESPIRATORY (INHALATION) EVERY 6 HOURS PRN
Qty: 75 ML | Refills: 0 | OUTPATIENT
Start: 2018-02-22

## 2018-02-22 RX ORDER — METOPROLOL SUCCINATE 25 MG/1
25 TABLET, EXTENDED RELEASE ORAL DAILY
Qty: 90 TABLET | Refills: 0 | Status: SHIPPED | OUTPATIENT
Start: 2018-02-22 | End: 2019-01-25

## 2018-02-22 RX ORDER — CARBOXYMETHYLCELLULOSE SODIUM 5 MG/ML
1 SOLUTION/ DROPS OPHTHALMIC 3 TIMES DAILY PRN
Qty: 1 BOTTLE | Refills: 11 | Status: SHIPPED | OUTPATIENT
Start: 2018-02-22 | End: 2019-01-25

## 2018-02-22 RX ORDER — SUCRALFATE 1 G/1
1 TABLET ORAL 4 TIMES DAILY
Qty: 40 TABLET | Refills: 1 | Status: SHIPPED | OUTPATIENT
Start: 2018-02-22 | End: 2018-09-28

## 2018-02-22 RX ORDER — ALBUTEROL SULFATE 90 UG/1
2 AEROSOL, METERED RESPIRATORY (INHALATION) EVERY 6 HOURS PRN
Qty: 1 INHALER | Refills: 1 | Status: SHIPPED | OUTPATIENT
Start: 2018-02-22 | End: 2019-08-19

## 2018-02-22 RX ORDER — ALBUTEROL SULFATE 0.83 MG/ML
1 SOLUTION RESPIRATORY (INHALATION) EVERY 6 HOURS PRN
Qty: 25 VIAL | Refills: 1 | Status: SHIPPED | OUTPATIENT
Start: 2018-02-22 | End: 2019-08-19

## 2018-02-22 RX ORDER — PANTOPRAZOLE SODIUM 40 MG/1
40 TABLET, DELAYED RELEASE ORAL 2 TIMES DAILY
Qty: 60 TABLET | Refills: 1 | Status: SHIPPED | OUTPATIENT
Start: 2018-02-22 | End: 2018-08-07

## 2018-02-22 RX ORDER — ATORVASTATIN CALCIUM 20 MG/1
20 TABLET, FILM COATED ORAL DAILY
Qty: 30 TABLET | Refills: 3 | Status: SHIPPED | OUTPATIENT
Start: 2018-02-22 | End: 2019-01-25

## 2018-02-22 NOTE — NURSING NOTE
No chief complaint on file.      Initial /60 (BP Location: Right arm, Patient Position: Sitting, Cuff Size: Adult Large)  Pulse 62  Temp 97.4  F (36.3  C) (Oral)  Ht 5' (1.524 m)  Wt 157 lb 14.4 oz (71.6 kg)  SpO2 100%  BMI 30.84 kg/m2 Estimated body mass index is 30.84 kg/(m^2) as calculated from the following:    Height as of this encounter: 5' (1.524 m).    Weight as of this encounter: 157 lb 14.4 oz (71.6 kg).  Medication Reconciliation: complete

## 2018-02-22 NOTE — PATIENT INSTRUCTIONS
Plan:  1. Holter monitor - To schedule this test please call [facility] at: [phone]   2. Stool test for the H Pylori  3. Protonix twice a day for 2 weeks then once a day  4. Sucralfate 1 gram 3-4 times a day for 10 days  5. Atorvastatin 20 mg daily   6. Please make an appointment a week after the heart monitor to discuss about the results.   7. Do urine test few days before the next appointment   8. Dermatology referral

## 2018-02-22 NOTE — MR AVS SNAPSHOT
After Visit Summary   2/22/2018    Raissa Encarnacion    MRN: [id number]           Patient Information     Date Of Birth          1945        Visit Information        Provider Department      2/22/2018 2:25 PM Hayley Francisco MD; [employer] [facility]        Today's Diagnoses     Palpitations    -  1    Abdominal pain, epigastric        Hyperlipidemia LDL goal <130        S/P aortic valve replacement Meng, life long ASA,         Dry eyes        Mild intermittent asthma, unspecified whether complicated        Microscopic hematuria        Rash and nonspecific skin eruption          Care Instructions    Plan:  1. Holter monitor - To schedule this test please call [place] [facility] at: [phone]   2. Stool test for the H Pylori  3. Protonix twice a day for 2 weeks then once a day  4. Sucralfate 1 gram 3-4 times a day for 10 days  5. Atorvastatin 20 mg daily   6. Please make an appointment a week after the heart monitor to discuss about the results.   7. Do urine test few days before the next appointment   8. Dermatology referral           Follow-ups after your visit        Additional Services     DERMATOLOGY REFERRAL       Your provider has referred you to:     [facility] [place] [phone]   [email or web address] [phone]   [email or web address]    [facility] - [place] [phone]  [email or web address]  [facility] - [place] [phone]   [email or web address]  [place] [phone]   [email or web address]  FHN: [facility] - [place] [phone]   [email or web address]://[email or web address]  FHN: [facility] [place] [phone]  [email or web address]  FHN: [facility] - [place] [phone]   [email or web address]  FHN: [facility] - [place] [phone]   [email or web address]  [place] [phone]   [email or web address]  [facility] [place] [phone]   [email or web address]  [facility] - [place] [phone]   [email or web address]://[email or web address]  [facility] D[facility]atology [facility] [phone]   [email or web address]://[email or web address]/  [facility] [place] [phone]   [email or web address]  [facility] - [place] [phone]   [email or web address]    Please be aware that coverage of these services is subject to the terms and limitations of your health insurance plan.  Call member services at your health plan with any benefit or coverage questions.      Please bring the following with you to your appointment:    (1) Any X-Rays, CTs or MRIs which have been performed.  Contact the facility where they were done to arrange for  prior to your scheduled appointment.    (2) List of current medications  (3) This referral request   (4) Any documents/labs given to you for this referral                  Your next 10 appointments already scheduled     Feb 23, 2018  9:45 AM CST   [facility] Extremity with Lena Santiago, PT   [facility] PT ([facility]  )    [address] [address] [address] [phone]              Future tests that were ordered for you today     Open Future Orders        Priority Expected Expires Ordered    UA with Microscopic reflex to Culture Routine  2/22/2019 2/22/2018    Zio Patch Holter Routine  4/8/2018 2/22/2018            Who to contact     If you have questions or need follow up information about today's clinic visit or your schedule please contact [facility] directly at [phone].  Normal or non-critical lab and imaging results will be  "communicated to you by MyChart, letter or phone within 4 business days after the clinic has received the results. If you do not hear from us within 7 days, please contact the clinic through [employer] or phone. If you have a critical or abnormal lab result, we will notify you by phone as soon as possible.  Submit refill requests through [employer] or call your pharmacy and they will forward the refill request to us. Please allow 3 business days for your refill to be completed.          Additional Information About Your Visit        [employer] Information     [employer] lets you send messages to your doctor, view your test results, renew your prescriptions, schedule appointments and more. To sign up, go to [email or web address] . Click on \"Log in\" on the left side of the screen, which will take you to the Welcome page. Then click on \"Sign up Now\" on the right side of the page.     You will be asked to enter the access code listed below, as well as some personal information. Please follow the directions to create your username and password.     Your access code is: [email or web address]  Expires: 2018  8:59 PM     Your access code will  in 90 days. If you need help or a new code, please call your [place] clinic or [phone].        Care EveryWhere ID     This is your Care EveryWhere ID. This could be used by other organizations to access your [place] medical records  [id number]        Your Vitals Were     Pulse Temperature Height Pulse Oximetry BMI (Body Mass Index)       62 97.4  F (36.3  C) (Oral) 5' (1.524 m) 100% 30.84 kg/m2        Blood Pressure from Last 3 Encounters:   18 128/60   18 137/62   17 110/56    Weight from Last 3 Encounters:   18 157 lb 14.4 oz (71.6 kg)   17 158 lb (71.7 kg)   17 154 lb 9.6 oz (70.1 kg)              We Performed the Following     DERMATOLOGY REFERRAL          Today's Medication Changes          These changes are accurate as of 18  3:43 PM.  If " you have any questions, ask your nurse or doctor.               Start taking these medicines.        Dose/Directions    sucralfate 1 GM tablet   Commonly known as:  CARAFATE   Used for:  Abdominal pain, epigastric   Started by:  Hayley Francisco MD        Dose:  1 g   Take 1 tablet (1 g) by mouth 4 times daily   Quantity:  40 tablet   Refills:  1         These medicines have changed or have updated prescriptions.        Dose/Directions    * albuterol 108 (90 BASE) MCG/ACT Inhaler   Commonly known as:  PROAIR HFA/PROVENTIL HFA/VENTOLIN HFA   This may have changed:  Another medication with the same name was added. Make sure you understand how and when to take each.   Changed by:  Hayley Francisco MD        Dose:  2 puff   Inhale 2 puffs into the lungs every 6 hours as needed for shortness of breath / dyspnea or wheezing   Quantity:  1 Inhaler   Refills:  1       * albuterol (2.5 MG/3ML) 0.083% neb solution   This may have changed:  Another medication with the same name was added. Make sure you understand how and when to take each.   Used for:  Moderate persistent asthma without complication   Changed by:  Hayley Francisco MD        Dose:  1 vial   Take 1 vial (2.5 mg) by nebulization every 6 hours as needed for shortness of breath / dyspnea or wheezing   Quantity:  75 mL   Refills:  0       * albuterol 108 (90 BASE) MCG/ACT Inhaler   Commonly known as:  PROAIR HFA/PROVENTIL HFA/VENTOLIN HFA   This may have changed:  You were already taking a medication with the same name, and this prescription was added. Make sure you understand how and when to take each.   Used for:  Mild intermittent asthma, unspecified whether complicated   Changed by:  Hayley Francisco MD        Dose:  2 puff   Inhale 2 puffs into the lungs every 6 hours as needed for shortness of breath / dyspnea or wheezing   Quantity:  1 Inhaler   Refills:  1       * albuterol (2.5 MG/3ML) 0.083% neb  solution   This may have changed:  You were already taking a medication with the same name, and this prescription was added. Make sure you understand how and when to take each.   Used for:  Mild intermittent asthma, unspecified whether complicated   Changed by:  Hayley Francisco MD        Dose:  1 vial   Take 1 vial (2.5 mg) by nebulization every 6 hours as needed for shortness of breath / dyspnea or wheezing   Quantity:  25 vial   Refills:  1       * ATORVASTATIN CALCIUM PO   This may have changed:  Another medication with the same name was added. Make sure you understand how and when to take each.   Changed by:  Hayley Francisco MD        Dose:  20 mg   Take 20 mg by mouth   Refills:  0       * atorvastatin 20 MG tablet   Commonly known as:  LIPITOR   This may have changed:  You were already taking a medication with the same name, and this prescription was added. Make sure you understand how and when to take each.   Used for:  Hyperlipidemia LDL goal <130   Changed by:  Hayley Francisco MD        Dose:  20 mg   Take 1 tablet (20 mg) by mouth daily   Quantity:  30 tablet   Refills:  3       carboxymethylcellulose 0.5 % Soln ophthalmic solution   Commonly known as:  REFRESH PLUS   This may have changed:    - how to take this  - when to take this  - reasons to take this   Used for:  Dry eyes   Changed by:  Hayley Francisco MD        Dose:  1 drop   Place 1 drop into both eyes 3 times daily as needed for dry eyes   Quantity:  1 Bottle   Refills:  11       pantoprazole 40 MG EC tablet   Commonly known as:  PROTONIX   This may have changed:  when to take this   Used for:  Abdominal pain, epigastric   Changed by:  Hayley Francisco MD        Dose:  40 mg   Take 1 tablet (40 mg) by mouth 2 times daily   Quantity:  60 tablet   Refills:  1       * Notice:  This list has 6 medication(s) that are the same as other medications prescribed for you. Read  the directions carefully, and ask your doctor or other care provider to review them with you.         Where to get your medicines      These medications were sent to [facility] - [place], [place] - [address] AT [facility] [address]  [address], [facility] [address]     Phone:  [phone]     albuterol (2.5 MG/3ML) 0.083% neb solution    albuterol 108 (90 BASE) MCG/ACT Inhaler    atorvastatin 20 MG tablet    carboxymethylcellulose 0.5 % Soln ophthalmic solution    metoprolol succinate 25 MG 24 hr tablet    pantoprazole 40 MG EC tablet    sucralfate 1 GM tablet                Primary Care Provider Office Phone # Fax #    Hayley Francisco [phone]       [address]  [facility] [address]        Equal Access to Services     [facility]: Hadii aad ku hadasho Soomaali, waaxda luqadaha, qaybta kaalmada adeegyada, waxay idiin hayaan abebe young . So [facility] [phone].    ATENCIÓN: Si habla español, tiene a montes de oca disposición servicios gratuitos de asistencia lingüística. [facility] [phone].    We comply with applicable federal civil rights laws and [place] laws. We do not discriminate on the basis of race, color, national origin, age, disability, sex, sexual orientation, or gender identity.            Thank you!     Thank you for choosing [facility]  for your care. Our goal is always to provide you with excellent care. Hearing back from our patients is one way we can continue to improve our services. Please take a few minutes to complete the written survey that you may receive in the mail after your visit with us. Thank you!             Your Updated Medication List - Protect others around you: Learn how to safely use, store and throw away your medicines at www.disposemymeds.org.          This list is accurate as of 2/22/18  3:43 PM.  Always use your most recent med list.                   Brand Name  Dispense Instructions for use Diagnosis    * albuterol 108 (90 BASE) MCG/ACT Inhaler    PROAIR HFA/PROVENTIL HFA/VENTOLIN HFA    1 Inhaler    Inhale 2 puffs into the lungs every 6 hours as needed for shortness of breath / dyspnea or wheezing        * albuterol (2.5 MG/3ML) 0.083% neb solution     75 mL    Take 1 vial (2.5 mg) by nebulization every 6 hours as needed for shortness of breath / dyspnea or wheezing    Moderate persistent asthma without complication       * albuterol 108 (90 BASE) MCG/ACT Inhaler    PROAIR HFA/PROVENTIL HFA/VENTOLIN HFA    1 Inhaler    Inhale 2 puffs into the lungs every 6 hours as needed for shortness of breath / dyspnea or wheezing    Mild intermittent asthma, unspecified whether complicated       * albuterol (2.5 MG/3ML) 0.083% neb solution     25 vial    Take 1 vial (2.5 mg) by nebulization every 6 hours as needed for shortness of breath / dyspnea or wheezing    Mild intermittent asthma, unspecified whether complicated       alendronate 70 MG tablet    FOSAMAX    12 tablet    Take 1 tablet (70 mg) by mouth with 8oz water every 7 days 30 minutes before breakfast and remain upright during this time.    Osteoporosis, unspecified osteoporosis type, unspecified pathological fracture presence       aspirin EC 81 MG EC tablet      Take 81 mg by mouth        * ATORVASTATIN CALCIUM PO      Take 20 mg by mouth        * atorvastatin 20 MG tablet    LIPITOR    30 tablet    Take 1 tablet (20 mg) by mouth daily    Hyperlipidemia LDL goal <130       calcium carbonate 500 MG chewable tablet    TUMS     1 tab q6h prn heartburn        carboxymethylcellulose 0.5 % Soln ophthalmic solution    REFRESH PLUS    1 Bottle    Place 1 drop into both eyes 3 times daily as needed for dry eyes    Dry eyes       cholecalciferol 1000 UNIT tablet    vitamin D3     Take 1,000 Units by mouth        CoQ10 100 MG Caps     30 capsule    Take 1 capsule (100 mg) by mouth daily    Hyperlipidemia LDL goal <130, Muscle ache        loratadine 10 MG tablet    CLARITIN     Take 10 mg by mouth        metoprolol succinate 25 MG 24 hr tablet    TOPROL-XL    90 tablet    Take 1 tablet (25 mg) by mouth daily    S/P aortic valve replacement       MULTI-VITAMINS Tabs      Take 1 tablet by mouth        order for DME     1 Device    Nebulizer machine    Moderate persistent asthma without complication       oxybutynin 10 MG 24 hr tablet    DITROPAN XL    90 tablet    Take 1 tablet (10 mg) by mouth daily as needed for bladder spasms    Urinary urgency       pantoprazole 40 MG EC tablet    PROTONIX    60 tablet    Take 1 tablet (40 mg) by mouth 2 times daily    Abdominal pain, epigastric       sucralfate 1 GM tablet    CARAFATE    40 tablet    Take 1 tablet (1 g) by mouth 4 times daily    Abdominal pain, epigastric       * Notice:  This list has 6 medication(s) that are the same as other medications prescribed for you. Read the directions carefully, and ask your doctor or other care provider to review them with you.

## 2018-02-22 NOTE — PROGRESS NOTES
Dr Story's note      Patient's instructions / PLAN:                                                        Plan:  1. Holter monitor - To schedule this test please call [facility] at: [phone]   2. Stool test for the H Pylori  3. Protonix twice a day for 2 weeks then once a day  4. Sucralfate 1 gram 3-4 times a day for 10 days  5. Atorvastatin 20 mg daily   6. Please make an appointment a week after the heart monitor to discuss about the results.   7. Do urine test few days before the next appointment   8. Dermatology referral         ASSESSMENT & PLAN:                                                      (R00.2) Palpitations  (primary encounter diagnosis)  Comment: less.  Since they are not bothering her I would not increase beta blockers.  We will wait for heart monitor result  Plan: Zio Patch Holter            (Z95.2) S/P aortic valve replacement Meng, life long ASA,   Comment:   Plan: metoprolol succinate (TOPROL-XL) 25 MG 24 hr         tablet            (R10.13) Abdominal pain, epigastric  Comment:   Plan: pantoprazole (PROTONIX) 40 MG EC tablet,         sucralfate (CARAFATE) 1 GM tablet, H Pylori         antigen, stool            (E78.5) Hyperlipidemia LDL goal <130  Comment: Not controlled   Plan: atorvastatin (LIPITOR) 20 MG tablet            (H04.123) Dry eyes  Comment:   Plan: carboxymethylcellulose (REFRESH PLUS) 0.5 %         SOLN ophthalmic solution            (J45.20) Mild intermittent asthma, unspecified whether complicated  Comment: Controlled    Plan: albuterol (PROAIR HFA/PROVENTIL HFA/VENTOLIN         HFA) 108 (90 BASE) MCG/ACT Inhaler, albuterol         (2.5 MG/3ML) 0.083% neb solution            (R31.29) Microscopic hematuria  Comment:   Plan: UA with Microscopic reflex to Culture            (R21) Rash and nonspecific skin eruption  Comment:   Plan: DERMATOLOGY REFERRAL               Chief complaint:                                                          Palpitation, abdominal pain,  fatigue, hyperlipidemia, asthma, hematuria, skin changes  Due to language barrier, an  was present during the history-taking and subsequent discussion (and for part of the physical exam) with this patient.   The daughter translated for the day since she is the caregiver    SUBJECTIVE:   Raissa Encarnacion is a 73 year old female who presents to clinic today for the following health issues:    In ER with palpitations and abd pain   UA with blood. She received abx for UTI    Palpitations on/off - not the biggest c/o   Cardio appointment at [facility] March 21    abd pain:  -- The biggest c/o is abd pain. She has received tx for H Pylori more than 5 y ago.   -- all opver the abd most in the epig area   -- food makes it worse She avoids eating   -- abd CT - neg   -- she takes the Protonix   -- stopped Fosamax    Daughter states mom has been really sick: no energy, no appetitie x 1 week.     Labs - fine   ECG sinus daniel     Hyperlipidemia   -- she had side effects ( SOB) with Pravastatin   -- she takes Lipitor 20  Mg     Hx asthma  -- needs albuterol   --    Zio patch ordered Daughter thought someone will come to their home to give it to her. I will order in my name     Ua with blood     Skin changes  --The patient told her daughter a few days ago about skin changes.  There were very itching at the beginning.  Now she does not have pruritus or pain  --On exam she has hyperpigmented macular loss on the inner thighs and abdomen    ED/UC Followup:    Facility[facility]  Date of visit: 02/17/18  Reason for visit: Palpitations, UTI  Current Status: Palpitations persist, but less.  The major complaint is abdominal discomfort    I reviewed the ER physician note         Review of Systems:                                                      ROS: negative for fever, chills, cough, wheezes, chest pain, shortness of breath, vomiting, leg swelling positive for abdominal pain, as above      OBJECTIVE:             Physical exam:  Blood  pressure 128/60, pulse 62, temperature 97.4  F (36.3  C), temperature source Oral, height 5' (1.524 m), weight 157 lb 14.4 oz (71.6 kg), SpO2 100 %, not currently breastfeeding.   NAD, appears comfortable  Skin: As above  Neck: supple, no JVD,  No thyroidmegaly. Lymph nodes nonpalpable cervical and supraclavicular.  Chest: clear to auscultation bilaterally, good respiratory effort  Heart: S1 S2, RRR, no mgr appreciated  Abdomen: soft, epigastric tender, no hepatosplenomegaly or masses appreciated, no abdominal bruit, present bowel sounds  Extremities: no edema,   Neurologic: A, Ox3, no focal signs appreciated    PMHx: reviewed  Past Medical History:   Diagnosis Date     Memory deficit 8/14/2017     S/P aortic valve replacement [facility], life long ASA, Plavix 9/14/2016     Uncomplicated asthma       PSHx: reviewed  Past Surgical History:   Procedure Laterality Date     CARDIAC SURGERY      aortic valve replacement        Meds: reviewed  Current Outpatient Prescriptions   Medication Sig Dispense Refill     ATORVASTATIN CALCIUM PO Take 20 mg by mouth        metoprolol (TOPROL-XL) 25 MG 24 hr tablet Take 1 tablet (25 mg) by mouth daily 90 tablet 0     pantoprazole (PROTONIX) 40 MG EC tablet Take 40 mg by mouth daily (with breakfast)       aspirin EC 81 MG tablet Take 81 mg by mouth       cholecalciferol (VITAMIN D3) 1000 UNIT tablet Take 1,000 Units by mouth       loratadine (CLARITIN) 10 MG tablet Take 10 mg by mouth       Multiple Vitamin (MULTI-VITAMINS) TABS Take 1 tablet by mouth       oxybutynin (DITROPAN XL) 10 MG 24 hr tablet Take 1 tablet (10 mg) by mouth daily as needed for bladder spasms 90 tablet 1     Coenzyme Q10 (COQ10) 100 MG CAPS Take 1 capsule (100 mg) by mouth daily 30 capsule 3     order for DME Nebulizer machine 1 Device 0     albuterol (2.5 MG/3ML) 0.083% neb solution Take 1 vial (2.5 mg) by nebulization every 6 hours as needed for shortness of breath / dyspnea or wheezing 75 mL 0     alendronate  (FOSAMAX) 70 MG tablet Take 1 tablet (70 mg) by mouth with 8oz water every 7 days 30 minutes before breakfast and remain upright during this time. 12 tablet 1     albuterol (PROAIR HFA/PROVENTIL HFA/VENTOLIN HFA) 108 (90 BASE) MCG/ACT Inhaler Inhale 2 puffs into the lungs every 6 hours as needed for shortness of breath / dyspnea or wheezing 1 Inhaler 1     calcium carbonate (TUMS) 500 MG chewable tablet 1 tab q6h prn heartburn       carboxymethylcellulose (REFRESH PLUS) 0.5 % SOLN 1 drop         Soc Hx: reviewed  Fam Hx: reviewed      Time spent with the patient  40 min, more than 50% in counseling and coordinating care, Re above medical problems recent ER visit, palpitation, abdominal pain, hyperlipidemia, dry eyes, asthma, microscopic hematuria, skin rash    Hayley Story MD  Internal Medicine

## 2018-02-23 ENCOUNTER — TELEPHONE (OUTPATIENT)
Dept: INTERNAL MEDICINE | Facility: CLINIC | Age: 73
End: 2018-02-23

## 2018-02-23 ASSESSMENT — ASTHMA QUESTIONNAIRES: ACT_TOTALSCORE: 25

## 2018-02-23 NOTE — TELEPHONE ENCOUNTER
Prior Authorization Retail Medication Request  Medication/Dose:   Diagnosis and ICD code: r10.13  New/Renewal/Insurance Change PA: unknown  Previously Tried and Failed Therapies: unknown    Insurance ID (if provided): [id number]  Insurance Phone (if provided): [phone]    Any additional info from fax request:     If you received a fax notification from an outside Pharmacy:  Pharmacy Name:[facility]  Pharmacy #:[phone]  Pharmacy Fax:[phone]

## 2018-02-26 DIAGNOSIS — R31.29 MICROSCOPIC HEMATURIA: ICD-10-CM

## 2018-02-26 DIAGNOSIS — R10.13 ABDOMINAL PAIN, EPIGASTRIC: ICD-10-CM

## 2018-02-26 LAB
ALBUMIN UR-MCNC: NEGATIVE MG/DL
APPEARANCE UR: CLEAR
BACTERIA #/AREA URNS HPF: ABNORMAL /HPF
BILIRUB UR QL STRIP: NEGATIVE
COLOR UR AUTO: YELLOW
GLUCOSE UR STRIP-MCNC: NEGATIVE MG/DL
HGB UR QL STRIP: ABNORMAL
KETONES UR STRIP-MCNC: NEGATIVE MG/DL
LEUKOCYTE ESTERASE UR QL STRIP: ABNORMAL
NITRATE UR QL: NEGATIVE
PH UR STRIP: 5 PH (ref 5–7)
RBC #/AREA URNS AUTO: ABNORMAL /HPF
SOURCE: ABNORMAL
SP GR UR STRIP: <=1.005 (ref 1–1.03)
UROBILINOGEN UR STRIP-ACNC: 0.2 EU/DL (ref 0.2–1)
WBC #/AREA URNS AUTO: ABNORMAL /HPF

## 2018-02-26 PROCEDURE — 81001 URINALYSIS AUTO W/SCOPE: CPT | Performed by: INTERNAL MEDICINE

## 2018-02-26 PROCEDURE — 87338 HPYLORI STOOL AG IA: CPT | Performed by: INTERNAL MEDICINE

## 2018-02-26 NOTE — LETTER
[facility]  [address], [address]  [place] [address]  [phone]        March 1, 2018    Raissa Encarnacion  [id number] [address] [address]            Dear Ms. Raissa Encarnacion:    The recent urine test showed persistent blood.  Please make an appointment with urology doctor.  Enclosed you will find a referral.    Sincerely,    Hayley Story MD  Internal Medicine

## 2018-02-28 LAB
H PYLORI AG STL QL IA: NORMAL
SPECIMEN SOURCE: NORMAL

## 2018-02-28 NOTE — TELEPHONE ENCOUNTER
PA Initiation    Medication: pantoprazole - initiated  Insurance Company: [employer] - Phone [phone] Fax [phone]  Pharmacy Filling the Rx: [facility] [address] [place] [address] [address] AT [place] [address] [address]  Filling Pharmacy Phone: [phone]  Filling Pharmacy Fax:    Start Date: 2/28/2018  Case# [id number]  Called and had to initiated PA over the phone - PT's plan does not enable ePA for the plan - Rep. Stated we shouold hear back within 72 hours

## 2018-02-28 NOTE — TELEPHONE ENCOUNTER
Prior Authorization Approval    Authorization Effective Date: 1/1/2018  Authorization Expiration Date: 2/28/2019  Medication: pantoprazole - approved  Approved Dose/Quantity: 30 for 30 days  Reference #:     Insurance Company: [employer] [phone] Fax [phone]  Expected CoPay: unknown - rts   2/3/18  CoPay Card Available:      Foundation Assistance Needed:    Which Pharmacy is filling the prescription (Not needed for infusion/clinic administered): [facility] [address] [place] [address] [address] AT [place] [address] & [facility]  Pharmacy Notified: Yes  Patient Notified: Yes

## 2018-03-01 ENCOUNTER — HOSPITAL ENCOUNTER (OUTPATIENT)
Dept: CARDIOLOGY | Facility: CLINIC | Age: 73
Discharge: HOME | End: 2018-03-01
Attending: INTERNAL MEDICINE | Admitting: INTERNAL MEDICINE
Payer: COMMERCIAL

## 2018-03-01 DIAGNOSIS — R31.29 MICROSCOPIC HEMATURIA: Primary | ICD-10-CM

## 2018-03-01 DIAGNOSIS — R00.2 PALPITATIONS: ICD-10-CM

## 2018-03-01 PROCEDURE — 0298T ZZC EXT ECG > 48HR TO 21 DAY REVIEW AND INTERPRETATN: CPT | Performed by: INTERNAL MEDICINE

## 2018-03-01 PROCEDURE — 0296T ZIO PATCH HOLTER: CPT

## 2018-03-01 NOTE — LETTER
[facility]  [address], [address]  [place] [address]  [phone]        March 25, 2018    Raissa Encarnacion  [address] [id number]            Dear Ms. Raissa Encarnacion:      The recent heart monitor result showed few extra beats, that you might experience them as palpitations.  We will discuss about this on your next office visit.    Sincerely,    Hayley Story MD  Internal Medicine

## 2018-03-19 PROBLEM — R29.898 LEG WEAKNESS, BILATERAL: Status: RESOLVED | Noted: 2017-10-09 | Resolved: 2018-03-19

## 2018-03-19 NOTE — PROGRESS NOTES
Subjective:  HPI                    Objective:  System    Physical Exam    General     ROS    Assessment/Plan:    DISCHARGE REPORT    Progress reporting period is from 10/9/17 to 2/16/18.       SUBJECTIVE  Subjective changes noted by patient: Pt reports she has noticed decrease in sx with increased reps of exercises. She reports she has no pain in standing.  She reports her heart has felt a little different and she has been more fatigued. She has set up an appointment with her cardiologist to discuss this. She reports she would like to continue with PT to get stronger for a few more sessions    Current pain level is 3/10.     Previous pain level was 7/10.   Changes in function:  Yes (See Goal flowsheet attached for changes in current functional level)  Adverse reaction to treatment or activity: None    OBJECTIVE  Changes noted in objective findings:  Yes, Last objective findings are below. Pt has failed to return to PT and current objective findings are unknown.  Objective: FIS to floor, EIS full /64, and 159/64. HR 70.     ASSESSMENT/PLAN  Following vital sign assessment, Pt was encouraged to discuss elevated systolic BP with her MD at her upcoming appointment.  Updated problem list and treatment plan: Diagnosis 1:  B lower extremity pain and weakness   STG/LTGs have been met or progress has been made towards goals:  Yes (See Goal flow sheet completed today.)  Assessment of Progress: The patient's condition is improving.  Self Management Plans:  Patient has been instructed in a home treatment program.    Raissa continues to require the following intervention to meet STG and LTG's:  PT intervention is no longer required to meet STG/LTG.    Recommendations:  This patient is ready to be discharged from therapy and continue their home treatment program.    Please refer to the daily flowsheet for treatment today, total treatment time and time spent performing 1:1 timed codes.

## 2018-08-07 DIAGNOSIS — R10.13 ABDOMINAL PAIN, EPIGASTRIC: ICD-10-CM

## 2018-08-07 NOTE — TELEPHONE ENCOUNTER
"Requested Prescriptions   Pending Prescriptions Disp Refills     pantoprazole (PROTONIX) 40 MG EC tablet  Last Written Prescription Date:  2/22/18  Last Fill Quantity: 60,  # refills: 1   Last office visit: 2/22/2018 with prescribing provider:  Jez   Future Office Visit:     60 tablet 1     Sig: Take 1 tablet (40 mg) by mouth 2 times daily    PPI Protocol Passed    8/7/2018  5:20 PM       Passed - Not on Clopidogrel (unless Pantoprazole ordered)       Passed - No diagnosis of osteoporosis on record       Passed - Recent (12 mo) or future (30 days) visit within the authorizing provider's specialty    Patient had office visit in the last 12 months or has a visit in the next 30 days with authorizing provider or within the authorizing provider's specialty.  See \"Patient Info\" tab in inbasket, or \"Choose Columns\" in Meds & Orders section of the refill encounter.           Passed - Patient is age 18 or older       Passed - No active pregnacy on record       Passed - No positive pregnancy test in past 12 months          "

## 2018-08-08 RX ORDER — PANTOPRAZOLE SODIUM 40 MG/1
40 TABLET, DELAYED RELEASE ORAL DAILY
Qty: 90 TABLET | Refills: 1 | Status: SHIPPED | OUTPATIENT
Start: 2018-08-08 | End: 2019-01-25

## 2018-08-17 ENCOUNTER — PATIENT OUTREACH (OUTPATIENT)
Dept: GERIATRIC MEDICINE | Facility: CLINIC | Age: 73
End: 2018-08-17

## 2018-08-17 ASSESSMENT — ACTIVITIES OF DAILY LIVING (ADL)
DEPENDENT_IADLS:: CLEANING;COOKING;LAUNDRY;SHOPPING;MEAL PREPARATION;MEDICATION MANAGEMENT;MONEY MANAGEMENT;TRANSPORTATION

## 2018-08-30 ENCOUNTER — PATIENT OUTREACH (OUTPATIENT)
Dept: GERIATRIC MEDICINE | Facility: CLINIC | Age: 73
End: 2018-08-30

## 2018-08-30 NOTE — PROGRESS NOTES
[facility] Care Coordination Contact    [facility] Home Visit Assessment     Home visit for Health Risk Assessment with Raissa Encarnacion completed on August 17, 2018    Type of residence:: Apartment  Current living arrangement:: I live in a private home with family     Assessment completed with:: Patient, Care Team Member, Children- daughter    Current Care Plan  Member currently receiving the following home care services:  None   Member currently receiving the following community resources: Housekeeping/Chore Agency, Lifeline, PCA    Medication Review  Medication reconciliation completed in Epic: Yes  Medication set-up & administration: Family/informal caregiver sets up weekly.  Family caregiver administers medications.  Medication understanding concerns (by member, family or CC): No  Medication adherence concerns (by member, family or CC): No    Mental/Behavioral Health   Depression Screening: See PHQ assessment flowsheet.          No current MH services-will place referral for Psychiatry and none    Falls Assessment:   Fallen 2 or more times in the past year?: No   Any fall with injury in the past year?: No    ADL/IADL Dependencies:   Dependent ADLs:: Bathing, Dressing, Grooming, Toileting, Ambulation-no assistive device (PCA assists with walking-hands on and supervision)  Dependent IADLs:: Cleaning, Cooking, Laundry, Shopping, Meal Preparation, Medication Management, Money Management, Transportation    [facility] sponsored benefits: Shared information re: Silver Sneakers/gym memberships, ASA, Calcium +D.    PCA Assessment completed at visit: Yes     Elderly Waiver Eligibility: Yes-will continue on EW    Care Plan & Recommendations: Recommendation is to continue on Elderly waiver with 3.5 hrs/day of PCA, 6 hrs/week of homemaking, lifeline, and monthly supplies.    See [facility] for detailed assessment information.    Follow-Up Plan: Member informed of future contact, plan to f/u with member with a 6  month telephone assessment.  Contact information shared with member and family, encouraged member to call with any questions or concerns at any time.    [place] care continuum providers: Please refer to Health Care Home on the [facility] Problem List to view this patient's [facility] Care Plan Summary.    OTIS Carr  [facility]   [phone] (fax)  [email or web address]

## 2018-08-30 NOTE — PROGRESS NOTES
[facility] Care Coordination Contact  Received after visit chart from care coordinator.  Completed following tasks: Mailed copy of care plan to client, Updated services in access, Submitted referrals/auths for PERS and Homemaking and Entered MMIS  Chart was returned to CC.     [facility]:  Faxed completed PCA assessment to PCA Agency and mailed copies to member.  Faxed MD Communication to PCP.  Emailed referral form for auth to [facility].    Provider Signature - No POC Shared:  Member indicates that they do not want their POC shared with any EW providers.    Annie Lozada  Case Management Specialist  [facility]   [phone]

## 2018-08-30 NOTE — LETTER
August 30, 2018    Important Plan Information    KLAUDIA GOODHIR  [address] [address] [address]  Your Care Plan  Dear Klaudia,  When we spoke recently, I promised to send you a Care Plan. The plan enclosed is a summary of our discussion. It includes the steps we agreed would help you meet your health goals. In addition, I can help you with:  [employer]  This program is available to members who need a ride to medical and dental visits. To schedule a ride, call [phone] or [phone] (toll free). TTY/TTD: 711. You can call Monday - Thursday 8 a.m. to 5 p.m. and Fridays 9 a.m. to 5 p.m.   Dakim   The Dakim program empowers you to improve your health through education and exercise. To learn more, visit [employer], or call [employer] at [phone] (toll free) (TTY:711) from 7 a.m. - 7 p.m. Central Time, Monday-Friday.  Health Care Directive   This form helps you outline your health care wishes. You can request a form from me and I will answer any questions you have before you discuss it with your doctor.   Annual Physical  Take a key step on your path to good health and set up an annual physical at your clinic.  Questions?  Call me at [phone] Monday-Friday between 8am and 5pm.  TTY/TTD: 711. As we discussed, I plan to be in touch with you again in 6 months to follow up via phone.  Sincerely,      Tiarra Villa, [facility]  [phone]    cc: member records            American Indians can continue to use [facility] and [facility] clinics. We will not require prior approval or impose any conditions for you to get services at these clinics. For elders age 65 years and older this includes Elderly Waiver (EW) services accessed through the [place]. If a doctor or other provider in a [facility] or [facility] clinic refers you to a provider in our network, we will not require you to see your primary care provider prior to the  referral.    For accessible formats of this publication or assistance with equal or access to our services, visit [employer]/contactmedicaid, or call [phone] (toll free) or use your preferred relay service.    Auxiliary Aids and Services.   [facility] provides auxiliary aids and services, like qualified interpreters or information in accessible formats, free of charge and in a timely manner, to ensure an equal opportunity to participate in our health care programs. Contact [employer] Customer Service at [employer]/contactmedicaid or call [phone] (toll free) or use your preferred relay service.    Language Assistance Services.   [facility] provides translated documents and spoken language interpreting, free of charge and in a timely manner, when language assistance services are necessary to ensure limited English speakers have meaningful access to our information and services. Contact [employer] at [employer]/contactmedicaid or call [phone] (toll free) or use your preferred relay service.     Civil Rights Notice  Discrimination is against the law. [facility] does not discriminate on the basis of any of the following:    Race    Color    National Origin    Creed    Restoration    Age    Public Assistance Status    Receipt of Health Care Services    Disability (including physical or mental impairment)    Sex (including sex stereotypes and gender identity)    Marital Status    Political Beliefs    Medical Condition    Genetic Information    Sexual Orientation    Claims Experience    Medical History    Health Status    Civil Rights Complaints.   You have the right to file a discrimination complaint if you believe you were treated in a discriminatory way by [facility]. You may contact any of the following four agencies directly to file a discrimination complaint.    U.S. Department of Health and Human Services  Office for Civil Rights (OCR)  You have the right to file a complaint with the OCR, a federal agency,  if you believe you have been discriminated against because of any of the following:    Race    Disability    Color    Sex (including sex stereotypes and gender identity)    National Origin    Age    Contact the OCR directly to file a complaint:         Director         U.S. Department of Health and Human Services  Office for Civil Rights         [address] [place] [address]         [address] [address]         [phone] (Voice)         [phone] (TDD)         Complaint Portal - [email or web address]     [place] Department of Human Rights (MDHR)  In [place], you have the right to file a complaint with the MDHR if you believe you have been discriminated against because of any of the following:      Race    Color    National Origin    Samaritan    Creed    Sex    Sexual Orientation    Marital Status    Public Assistance Status    Contact the MD directly to file a complaint:         [place] Department of Human Rights         [address] [address]         [phone] (voice)          [phone] (toll free)         711 or [phone] ([place] Relay)         [phone] (Fax)         [email or web address] (Email)     [place] Department of Human Services (DHS)  You have the right to file a complaint with [facility] if you believe you have been discriminated against in our health care programs because of any of the following:    Race    Color    National Origin    Creed    Samaritan    Age    Public Assistance Status    Receipt of Health Care Services    Disability (including physical or mental impairment)    Sex (including sex stereotypes and gender identity)    Marital Status    Political Beliefs    Medical Condition    Genetic Information    Sexual Orientation    Claims Experience    Medical History    Health Status    Complaints must be in writing and filed within 180 days of the date you discovered the  alleged discrimination. The complaint must contain your name and address and describe the discrimination you are complaining about. After we get your complaint, we will review it and notify you in writing about whether we have authority to investigate. If we do, we will investigate the complaint.      [facility] will notify you in writing of the investigation s outcome. You have a right to appeal the outcome if you disagree with the decision. To appeal, you must send a written request to have [facility] review the investigation outcome period. Be brief and state why you disagree with the decision. Include additional information you think is important.      If you file a complaint in this way, the people who work for the agency named in the complaint cannot retaliate against you. This means they cannot punish you in any way for filing a complaint. Filing a complaint in this way does not stop you from seeking out other legal or administration actions.     Contact [facility] directly to file a discrimination complaint:        ATTN: Civil Rights Coordinator        [place] [facility] of [facility]        Equal Opportunity and Access Division        [address] [address]        [place] [address] [phone] (voice) or use your preferred relay service     [facility] Complaint Notice   Contact [facility] directly to file a discrimination complaint:  [facility] Civil Rights Coordinator  Mail Route   [address]  [place] [address] [phone] (voice) or use your preferred relay service  [email or web address]

## 2018-09-21 ENCOUNTER — PATIENT OUTREACH (OUTPATIENT)
Dept: GERIATRIC MEDICINE | Facility: CLINIC | Age: 73
End: 2018-09-21

## 2018-09-21 NOTE — PROGRESS NOTES
9/18/18  PC from Gina at [facility].  Does not have HM auth starting 9/1.  Has it been reauthorized? What day sent to [employer]?  Also needs copy of the pca assessment.  Fax is [phone], phone [phone] ext [phone].    OTIS Carr  [employer]   [phone] (fax)  [email or web address]

## 2018-09-21 NOTE — PROGRESS NOTES
9/21/18  PC to Gina. Let her know HM is still authorized. Submitted to [facility] on 8/30/18. She will watch for it. I will resend copy of pca assessment.  refaxed pca assessment to [phone].    OTIS Carr  [employer]   [phone] (fax)  [email or web address]

## 2018-09-28 ENCOUNTER — OFFICE VISIT (OUTPATIENT)
Dept: INTERNAL MEDICINE | Facility: CLINIC | Age: 73
End: 2018-09-28
Payer: COMMERCIAL

## 2018-09-28 VITALS
DIASTOLIC BLOOD PRESSURE: 70 MMHG | TEMPERATURE: 97.7 F | SYSTOLIC BLOOD PRESSURE: 138 MMHG | RESPIRATION RATE: 18 BRPM | HEART RATE: 70 BPM | BODY MASS INDEX: 31.22 KG/M2 | OXYGEN SATURATION: 99 % | HEIGHT: 60 IN | WEIGHT: 159 LBS

## 2018-09-28 DIAGNOSIS — R14.3 FLATULENCE, ERUCTATION, AND GAS PAIN: ICD-10-CM

## 2018-09-28 DIAGNOSIS — R14.2 FLATULENCE, ERUCTATION, AND GAS PAIN: ICD-10-CM

## 2018-09-28 DIAGNOSIS — K21.9 GASTROESOPHAGEAL REFLUX DISEASE WITHOUT ESOPHAGITIS: ICD-10-CM

## 2018-09-28 DIAGNOSIS — R14.1 FLATULENCE, ERUCTATION, AND GAS PAIN: ICD-10-CM

## 2018-09-28 DIAGNOSIS — E55.9 VITAMIN D DEFICIENCY: ICD-10-CM

## 2018-09-28 DIAGNOSIS — Z12.11 SPECIAL SCREENING FOR MALIGNANT NEOPLASMS, COLON: Primary | ICD-10-CM

## 2018-09-28 DIAGNOSIS — R10.13 ABDOMINAL PAIN, EPIGASTRIC: ICD-10-CM

## 2018-09-28 DIAGNOSIS — Z95.2 S/P AORTIC VALVE REPLACEMENT: ICD-10-CM

## 2018-09-28 LAB
BASOPHILS # BLD AUTO: 0.1 10E9/L (ref 0–0.2)
BASOPHILS NFR BLD AUTO: 0.8 %
DIFFERENTIAL METHOD BLD: NORMAL
EOSINOPHIL # BLD AUTO: 0.2 10E9/L (ref 0–0.7)
EOSINOPHIL NFR BLD AUTO: 2.6 %
ERYTHROCYTE [DISTWIDTH] IN BLOOD BY AUTOMATED COUNT: 13.4 % (ref 10–15)
HCT VFR BLD AUTO: 37.9 % (ref 35–47)
HGB BLD-MCNC: 12.6 G/DL (ref 11.7–15.7)
LYMPHOCYTES # BLD AUTO: 2.2 10E9/L (ref 0.8–5.3)
LYMPHOCYTES NFR BLD AUTO: 25.5 %
MCH RBC QN AUTO: 29.6 PG (ref 26.5–33)
MCHC RBC AUTO-ENTMCNC: 33.2 G/DL (ref 31.5–36.5)
MCV RBC AUTO: 89 FL (ref 78–100)
MONOCYTES # BLD AUTO: 0.7 10E9/L (ref 0–1.3)
MONOCYTES NFR BLD AUTO: 7.7 %
NEUTROPHILS # BLD AUTO: 5.5 10E9/L (ref 1.6–8.3)
NEUTROPHILS NFR BLD AUTO: 63.4 %
PLATELET # BLD AUTO: 170 10E9/L (ref 150–450)
RBC # BLD AUTO: 4.25 10E12/L (ref 3.8–5.2)
WBC # BLD AUTO: 8.7 10E9/L (ref 4–11)

## 2018-09-28 PROCEDURE — 82306 VITAMIN D 25 HYDROXY: CPT | Performed by: NURSE PRACTITIONER

## 2018-09-28 PROCEDURE — 80050 GENERAL HEALTH PANEL: CPT | Performed by: NURSE PRACTITIONER

## 2018-09-28 PROCEDURE — 99214 OFFICE O/P EST MOD 30 MIN: CPT | Performed by: NURSE PRACTITIONER

## 2018-09-28 PROCEDURE — 36415 COLL VENOUS BLD VENIPUNCTURE: CPT | Performed by: NURSE PRACTITIONER

## 2018-09-28 RX ORDER — SUCRALFATE 1 G/1
1 TABLET ORAL 3 TIMES DAILY
Qty: 90 TABLET | Refills: 3 | Status: SHIPPED | OUTPATIENT
Start: 2018-09-28 | End: 2019-01-25

## 2018-09-28 NOTE — MR AVS SNAPSHOT
After Visit Summary   9/28/2018    Raissa Encarnacion    MRN: [id number]           Patient Information     Date Of Birth          1945        Visit Information        Provider Department      9/28/2018 9:25 AM Sasha Dillard APRN CNP; [employer] [facility]        Today's Diagnoses     Special screening for malignant neoplasms, colon    -  1    Flatulence, eructation, and gas pain        Abdominal pain, epigastric        Gastroesophageal reflux disease without esophagitis        Vitamin D deficiency        S/P aortic valve replacement Meng, life long ASA,           Care Instructions    Carafate 1 tab three times a day for stomach   May decrease amount over time if tolerated     FIT test      Lab in [address]          Follow-ups after your visit        Future tests that were ordered for you today     Open Future Orders        Priority Expected Expires Ordered    Fecal colorectal cancer screen FIT Routine 10/19/2018 12/21/2018 9/28/2018            Who to contact     If you have questions or need follow up information about today's clinic visit or your schedule please contact [facility] directly at [phone].  Normal or non-critical lab and imaging results will be communicated to you by Interactive Fatehart, letter or phone within 4 business days after the clinic has received the results. If you do not hear from us within 7 days, please contact the clinic through [employer] or phone. If you have a critical or abnormal lab result, we will notify you by phone as soon as possible.  Submit refill requests through Interactive Fate or call your pharmacy and they will forward the refill request to us. Please allow 3 business days for your refill to be completed.          Additional Information About Your Visit        Interactive Fatehart Information     Interactive Fate lets you send messages to your doctor, view your test results, renew your prescriptions, schedule appointments and more. To sign  "up, go to [email or web address] . Click on \"Log in\" on the left side of the screen, which will take you to the Welcome page. Then click on \"Sign up Now\" on the right side of the page.     You will be asked to enter the access code listed below, as well as some personal information. Please follow the directions to create your username and password.     Your access code is: [email or web address]  Expires: 2018 10:32 AM     Your access code will  in 90 days. If you need help or a new code, please call your [facility] clinic or [phone].        Care EveryWhere ID     This is your Care EveryWhere ID. This could be used by other organizations to access your [facility] medical records  [id number]        Your Vitals Were     Pulse Temperature Respirations Height Pulse Oximetry BMI (Body Mass Index)    70 97.7  F (36.5  C) (Oral) 18 5' (1.524 m) 99% 31.05 kg/m2       Blood Pressure from Last 3 Encounters:   18 138/70   18 128/60   18 137/62    Weight from Last 3 Encounters:   18 159 lb (72.1 kg)   18 157 lb 14.4 oz (71.6 kg)   17 158 lb (71.7 kg)              We Performed the Following     CBC with platelets differential     Comprehensive metabolic panel     TSH with free T4 reflex     Vitamin D Deficiency          Today's Medication Changes          These changes are accurate as of 18 10:32 AM.  If you have any questions, ask your nurse or doctor.               These medicines have changed or have updated prescriptions.        Dose/Directions    sucralfate 1 GM tablet   Commonly known as:  CARAFATE   This may have changed:  when to take this   Used for:  Abdominal pain, epigastric   Changed by:  Sasha Dillard APRN CNP        Dose:  1 g   Take 1 tablet (1 g) by mouth 3 times daily   Quantity:  90 tablet   Refills:  3         Stop taking these medicines if you haven't already. Please contact your care team if you have questions.     alendronate 70 MG tablet   Commonly " known as:  FOSAMAX   Stopped by:  Sasha Dillard APRN CNP                Where to get your medicines      These medications were sent to [facility] [address] AT [facility] [address]  [address], [facility] [address]     Phone:  [phone]     sucralfate 1 GM tablet                Primary Care Provider Office Phone # Fax #    Hayley Saima Francisco [phone]       [address] [facility] [address]        Equal Access to Services     [facility]: Hadii aad ku hadasho Soomaali, waaxda luqadaha, qaybta kaalmada adeegyada, waxay idiin hayaan aderiya young . So [facility] [phone].    ATENCIÓN: Si habla español, tiene a montes de oca disposición servicios gratuitos de asistencia lingüística. [facility] [phone].    We comply with applicable federal civil rights laws and [place] laws. We do not discriminate on the basis of race, color, national origin, age, disability, sex, sexual orientation, or gender identity.            Thank you!     Thank you for choosing [facility]  for your care. Our goal is always to provide you with excellent care. Hearing back from our patients is one way we can continue to improve our services. Please take a few minutes to complete the written survey that you may receive in the mail after your visit with us. Thank you!             Your Updated Medication List - Protect others around you: Learn how to safely use, store and throw away your medicines at www.disposemymeds.org.          This list is accurate as of 9/28/18 10:32 AM.  Always use your most recent med list.                   Brand Name Dispense Instructions for use Diagnosis    * albuterol 108 (90 Base) MCG/ACT inhaler    PROAIR HFA/PROVENTIL HFA/VENTOLIN HFA    1 Inhaler    Inhale 2 puffs into the lungs every 6 hours as needed for shortness of breath / dyspnea or wheezing        * albuterol (2.5 MG/3ML) 0.083%  neb solution     75 mL    Take 1 vial (2.5 mg) by nebulization every 6 hours as needed for shortness of breath / dyspnea or wheezing    Moderate persistent asthma without complication       * albuterol 108 (90 Base) MCG/ACT inhaler    PROAIR HFA/PROVENTIL HFA/VENTOLIN HFA    1 Inhaler    Inhale 2 puffs into the lungs every 6 hours as needed for shortness of breath / dyspnea or wheezing    Mild intermittent asthma, unspecified whether complicated       * albuterol (2.5 MG/3ML) 0.083% neb solution     25 vial    Take 1 vial (2.5 mg) by nebulization every 6 hours as needed for shortness of breath / dyspnea or wheezing    Mild intermittent asthma, unspecified whether complicated       aspirin 81 MG EC tablet      Take 81 mg by mouth        * ATORVASTATIN CALCIUM PO      Take 20 mg by mouth        * atorvastatin 20 MG tablet    LIPITOR    30 tablet    Take 1 tablet (20 mg) by mouth daily    Hyperlipidemia LDL goal <130       calcium carbonate 500 MG chewable tablet    TUMS     1 tab q6h prn heartburn        carboxymethylcellulose 0.5 % Soln ophthalmic solution    REFRESH PLUS    1 Bottle    Place 1 drop into both eyes 3 times daily as needed for dry eyes    Dry eyes       cholecalciferol 1000 UNIT tablet    vitamin D3     Take 1,000 Units by mouth        CoQ10 100 MG Caps     30 capsule    Take 1 capsule (100 mg) by mouth daily    Hyperlipidemia LDL goal <130, Muscle ache       loratadine 10 MG tablet    CLARITIN     Take 10 mg by mouth        metoprolol succinate 25 MG 24 hr tablet    TOPROL-XL    90 tablet    Take 1 tablet (25 mg) by mouth daily    S/P aortic valve replacement       MULTI-VITAMINS Tabs      Take 1 tablet by mouth        order for DME     1 Device    Nebulizer machine    Moderate persistent asthma without complication       oxybutynin 10 MG 24 hr tablet    DITROPAN XL    90 tablet    Take 1 tablet (10 mg) by mouth daily as needed for bladder spasms    Urinary urgency       pantoprazole 40 MG EC tablet     PROTONIX    90 tablet    Take 1 tablet (40 mg) by mouth daily    Abdominal pain, epigastric       sucralfate 1 GM tablet    CARAFATE    90 tablet    Take 1 tablet (1 g) by mouth 3 times daily    Abdominal pain, epigastric       * Notice:  This list has 6 medication(s) that are the same as other medications prescribed for you. Read the directions carefully, and ask your doctor or other care provider to review them with you.

## 2018-09-28 NOTE — PATIENT INSTRUCTIONS
Carafate 1 tab three times a day for stomach   May decrease amount over time if tolerated     FIT test      Lab in [address]    Follow up in a month with Dr Story

## 2018-09-28 NOTE — NURSING NOTE
Chief Complaint   Patient presents with     Abdominal Pain     on/off ongoing for months, bloating, nausea, symptoms worse with eating. Pt used carafate in the past with help pt not sure if to take long term?      initial /70 (BP Location: Left arm, Patient Position: Chair, Cuff Size: Adult Regular)  Pulse 70  Temp 97.7  F (36.5  C) (Oral)  Resp 18  Ht 5' (1.524 m)  Wt 159 lb (72.1 kg)  SpO2 99%  BMI 31.05 kg/m2 Estimated body mass index is 31.05 kg/(m^2) as calculated from the following:    Height as of this encounter: 5' (1.524 m).    Weight as of this encounter: 159 lb (72.1 kg)..  bp completed using cuff size regular

## 2018-09-28 NOTE — PROGRESS NOTES
SUBJECTIVE:   Raissa Encarnacion is a 73 year old female who presents to clinic today for the following health issues:       Abdominal pain-on/off ongoing for months, bloating, nausea, symptoms worse with eating. Pt used carafate in the past with help pt not sure if to take long term?     Was helpful for her symptoms when she took it - carafate - she was only given 40 tab with one refill and this was February   She was tested in 2/2018 and negative H pylori    She is taking PPI         Problem list and histories reviewed & adjusted, as indicated.  Additional history: as documented    Patient Active Problem List   Diagnosis     S/P aortic valve replacement Meng, life long ASA,      Uncomplicated asthma     Vitamin D deficiency     Hyperlipidemia LDL goal <130     Memory deficit     Health Care Home     Advanced directives, counseling/discussion     Gastroesophageal reflux disease without esophagitis     Past Surgical History:   Procedure Laterality Date     CARDIAC SURGERY      aortic valve replacement       Social History   Substance Use Topics     Smoking status: Never Smoker     Smokeless tobacco: Never Used     Alcohol use No     Family History   Problem Relation Age of Onset     Family History Negative Mother      Family History Negative Father            Reviewed and updated as needed this visit by clinical staff  Tobacco  Allergies  Meds  Med Hx  Surg Hx  Fam Hx  Soc Hx      Reviewed and updated as needed this visit by Provider         ROS:  Constitutional, HEENT, cardiovascular, pulmonary, gi and gu systems are negative, except as otherwise noted.    OBJECTIVE:     /70 (BP Location: Left arm, Patient Position: Chair, Cuff Size: Adult Regular)  Pulse 70  Temp 97.7  F (36.5  C) (Oral)  Resp 18  Ht 5' (1.524 m)  Wt 159 lb (72.1 kg)  SpO2 99%  BMI 31.05 kg/m2  Body mass index is 31.05 kg/(m^2).  GENERAL: alert and no distress- here with daughter and     RESP: lungs clear   CV: regular  rate and rhythm  ABDOMEN: soft, nontender, and bowel sounds normal  PSYCH: mentation appears normal, affect normal/bright    Diagnostic Test Results:  Labs     ASSESSMENT/PLAN:             1. Special screening for malignant neoplasms, colon  Refused colonoscopy  - Fecal colorectal cancer screen FIT; Future    2. Flatulence, eructation, and gas pain  Helped in past with carafate- on PPI   - Comprehensive metabolic panel  - TSH with free T4 reflex  - CBC with platelets differential    3. Abdominal pain, epigastric    - sucralfate (CARAFATE) 1 GM tablet; Take 1 tablet (1 g) by mouth 3 times daily  Dispense: 90 tablet; Refill: 3    4. Gastroesophageal reflux disease without esophagitis    - Comprehensive metabolic panel  - TSH with free T4 reflex  - CBC with platelets differential    5. Vitamin D deficiency    - Vitamin D Deficiency    6. S/P aortic valve replacement Meng, life long ASA,     - Comprehensive metabolic panel  - TSH with free T4 reflex  - CBC with platelets differential    Patient Instructions   Carafate 1 tab three times a day for stomach   May decrease amount over time if tolerated     FIT test      Lab in [address]    Follow up in a month with HALLEY Sánchez [facility]

## 2018-09-29 LAB
ALBUMIN SERPL-MCNC: 4.1 G/DL (ref 3.4–5)
ALP SERPL-CCNC: 79 U/L (ref 40–150)
ALT SERPL W P-5'-P-CCNC: 19 U/L (ref 0–50)
ANION GAP SERPL CALCULATED.3IONS-SCNC: 9 MMOL/L (ref 3–14)
AST SERPL W P-5'-P-CCNC: 16 U/L (ref 0–45)
BILIRUB SERPL-MCNC: 0.5 MG/DL (ref 0.2–1.3)
BUN SERPL-MCNC: 12 MG/DL (ref 7–30)
CALCIUM SERPL-MCNC: 9.3 MG/DL (ref 8.5–10.1)
CHLORIDE SERPL-SCNC: 103 MMOL/L (ref 94–109)
CO2 SERPL-SCNC: 26 MMOL/L (ref 20–32)
CREAT SERPL-MCNC: 0.9 MG/DL (ref 0.52–1.04)
DEPRECATED CALCIDIOL+CALCIFEROL SERPL-MC: 39 UG/L (ref 20–75)
GFR SERPL CREATININE-BSD FRML MDRD: 61 ML/MIN/1.7M2
GLUCOSE SERPL-MCNC: 84 MG/DL (ref 70–99)
POTASSIUM SERPL-SCNC: 4.9 MMOL/L (ref 3.4–5.3)
PROT SERPL-MCNC: 7.4 G/DL (ref 6.8–8.8)
SODIUM SERPL-SCNC: 138 MMOL/L (ref 133–144)
TSH SERPL DL<=0.005 MIU/L-ACNC: 1.12 MU/L (ref 0.4–4)

## 2018-09-29 ASSESSMENT — ASTHMA QUESTIONNAIRES: ACT_TOTALSCORE: 24

## 2018-10-10 PROCEDURE — 82274 ASSAY TEST FOR BLOOD FECAL: CPT | Performed by: NURSE PRACTITIONER

## 2018-10-14 LAB — HEMOCCULT STL QL IA: NEGATIVE

## 2018-12-18 ENCOUNTER — TELEPHONE (OUTPATIENT)
Dept: PHARMACY | Facility: CLINIC | Age: 73
End: 2018-12-18

## 2018-12-18 NOTE — TELEPHONE ENCOUNTER
Notified from [facility] that patient may not be compliant with atorvastatin regimen.  Called patient with an , left message to offer a MTM appointment to review her medications.

## 2018-12-27 DIAGNOSIS — E78.5 HYPERLIPIDEMIA LDL GOAL <130: ICD-10-CM

## 2018-12-27 NOTE — TELEPHONE ENCOUNTER
"Requested Prescriptions   Pending Prescriptions Disp Refills     atorvastatin (LIPITOR) 20 MG tablet  Last Written Prescription Date:  2/22/18  Last Fill Quantity: 30,  # refills: 3   Last office visit: 9/28/2018 with prescribing provider:  Gilma   Future Office Visit:     30 tablet 3     Sig: Take 1 tablet (20 mg) by mouth daily    Statins Protocol Failed - 12/27/2018 11:09 AM       Failed - LDL on file in past 12 months    Recent Labs   Lab Test 09/21/17  0923   *            Passed - No abnormal creatine kinase in past 12 months    Recent Labs   Lab Test 09/21/17  0923   CKT 74               Passed - Recent (12 mo) or future (30 days) visit within the authorizing provider's specialty    Patient had office visit in the last 12 months or has a visit in the next 30 days with authorizing provider or within the authorizing provider's specialty.  See \"Patient Info\" tab in inbasket, or \"Choose Columns\" in Meds & Orders section of the refill encounter.             Passed - Patient is age 18 or older       Passed - No active pregnancy on record       Passed - No positive pregnancy test in past 12 months          "

## 2019-01-01 NOTE — TELEPHONE ENCOUNTER
Routing refill request to provider for review/approval because:  Labs not current:  FLP  A break in medication - patient should have ran out of refills months ago      Routing to [facility] Reception - please ask patient to schedule fasting lab only lab work

## 2019-01-03 RX ORDER — ATORVASTATIN CALCIUM 20 MG/1
20 TABLET, FILM COATED ORAL DAILY
Qty: 30 TABLET | Refills: 0 | OUTPATIENT
Start: 2019-01-03

## 2019-01-17 NOTE — TELEPHONE ENCOUNTER
"Requested Prescriptions   Pending Prescriptions Disp Refills     atorvastatin (LIPITOR) 20 MG tablet  Last Written Prescription Date:  2/22/18  Last Fill Quantity: 30,  # refills: 3   Last office visit: 9/28/2018 with prescribing provider:  Gilma   Future Office Visit:           Sig: Take 1 tablet (20 mg) by mouth    Statins Protocol Failed - 1/17/2019  1:35 PM       Failed - LDL on file in past 12 months    Recent Labs   Lab Test 09/21/17  0923   *            Passed - No abnormal creatine kinase in past 12 months    Recent Labs   Lab Test 09/21/17  0923   CKT 74               Passed - Recent (12 mo) or future (30 days) visit within the authorizing provider's specialty    Patient had office visit in the last 12 months or has a visit in the next 30 days with authorizing provider or within the authorizing provider's specialty.  See \"Patient Info\" tab in inbasket, or \"Choose Columns\" in Meds & Orders section of the refill encounter.             Passed - Medication is active on med list       Passed - Patient is age 18 or older       Passed - No active pregnancy on record       Passed - No positive pregnancy test in past 12 months          "

## 2019-01-19 RX ORDER — ATORVASTATIN CALCIUM 20 MG/1
20 TABLET, FILM COATED ORAL
OUTPATIENT
Start: 2019-01-19 | End: 2020-01-19

## 2019-01-19 NOTE — TELEPHONE ENCOUNTER
Per last office visit 9/28 patient was to follow up with primary care provider in 1 month. Due for lipids. Last refill request for this medication was denied by MD on 1/3 due to needing appointment. Denied. Patient was also contacted by MMT in December regarding this medication and did not return call. Please call patient for appointment.

## 2019-01-22 NOTE — TELEPHONE ENCOUNTER
Call to patient with [facility] . Daughter answered. No  needed. Appointment scheduled.

## 2019-01-25 ENCOUNTER — OFFICE VISIT (OUTPATIENT)
Dept: INTERNAL MEDICINE | Facility: CLINIC | Age: 74
End: 2019-01-25
Payer: COMMERCIAL

## 2019-01-25 VITALS
BODY MASS INDEX: 31.12 KG/M2 | RESPIRATION RATE: 18 BRPM | TEMPERATURE: 97.8 F | WEIGHT: 158.5 LBS | HEIGHT: 60 IN | OXYGEN SATURATION: 98 % | HEART RATE: 60 BPM | DIASTOLIC BLOOD PRESSURE: 70 MMHG | SYSTOLIC BLOOD PRESSURE: 130 MMHG

## 2019-01-25 DIAGNOSIS — E55.9 VITAMIN D DEFICIENCY: ICD-10-CM

## 2019-01-25 DIAGNOSIS — R10.13 ABDOMINAL PAIN, EPIGASTRIC: ICD-10-CM

## 2019-01-25 DIAGNOSIS — Z95.2 S/P AORTIC VALVE REPLACEMENT: ICD-10-CM

## 2019-01-25 DIAGNOSIS — J45.20 MILD INTERMITTENT ASTHMA WITHOUT COMPLICATION: ICD-10-CM

## 2019-01-25 DIAGNOSIS — E78.5 HYPERLIPIDEMIA LDL GOAL <130: ICD-10-CM

## 2019-01-25 DIAGNOSIS — H04.123 DRY EYES: ICD-10-CM

## 2019-01-25 DIAGNOSIS — K21.9 GASTROESOPHAGEAL REFLUX DISEASE WITHOUT ESOPHAGITIS: Primary | ICD-10-CM

## 2019-01-25 LAB
ERYTHROCYTE [DISTWIDTH] IN BLOOD BY AUTOMATED COUNT: 13.3 % (ref 10–15)
HCT VFR BLD AUTO: 39 % (ref 35–47)
HGB BLD-MCNC: 12.8 G/DL (ref 11.7–15.7)
MCH RBC QN AUTO: 29 PG (ref 26.5–33)
MCHC RBC AUTO-ENTMCNC: 32.8 G/DL (ref 31.5–36.5)
MCV RBC AUTO: 88 FL (ref 78–100)
PLATELET # BLD AUTO: 178 10E9/L (ref 150–450)
RBC # BLD AUTO: 4.42 10E12/L (ref 3.8–5.2)
WBC # BLD AUTO: 7.8 10E9/L (ref 4–11)

## 2019-01-25 PROCEDURE — 36415 COLL VENOUS BLD VENIPUNCTURE: CPT | Performed by: INTERNAL MEDICINE

## 2019-01-25 PROCEDURE — 99214 OFFICE O/P EST MOD 30 MIN: CPT | Performed by: INTERNAL MEDICINE

## 2019-01-25 PROCEDURE — 80053 COMPREHEN METABOLIC PANEL: CPT | Performed by: INTERNAL MEDICINE

## 2019-01-25 PROCEDURE — 85027 COMPLETE CBC AUTOMATED: CPT | Performed by: INTERNAL MEDICINE

## 2019-01-25 PROCEDURE — 80061 LIPID PANEL: CPT | Performed by: INTERNAL MEDICINE

## 2019-01-25 PROCEDURE — 82306 VITAMIN D 25 HYDROXY: CPT | Performed by: INTERNAL MEDICINE

## 2019-01-25 PROCEDURE — 84443 ASSAY THYROID STIM HORMONE: CPT | Performed by: INTERNAL MEDICINE

## 2019-01-25 RX ORDER — PANTOPRAZOLE SODIUM 40 MG/1
40 TABLET, DELAYED RELEASE ORAL
Qty: 180 TABLET | Refills: 1 | Status: SHIPPED | OUTPATIENT
Start: 2019-01-25 | End: 2019-07-17

## 2019-01-25 RX ORDER — CARBOXYMETHYLCELLULOSE SODIUM 5 MG/ML
1 SOLUTION/ DROPS OPHTHALMIC 3 TIMES DAILY PRN
Qty: 1 BOTTLE | Refills: 11 | Status: SHIPPED | OUTPATIENT
Start: 2019-01-25

## 2019-01-25 RX ORDER — METOPROLOL SUCCINATE 25 MG/1
37.5 TABLET, EXTENDED RELEASE ORAL DAILY
Qty: 90 TABLET | Refills: 0 | Status: SHIPPED | OUTPATIENT
Start: 2019-01-25 | End: 2019-04-10

## 2019-01-25 RX ORDER — SUCRALFATE 1 G/1
1 TABLET ORAL 3 TIMES DAILY
Qty: 90 TABLET | Refills: 5 | Status: SHIPPED | OUTPATIENT
Start: 2019-01-25 | End: 2020-01-22

## 2019-01-25 RX ORDER — ATORVASTATIN CALCIUM 20 MG/1
20 TABLET, FILM COATED ORAL DAILY
Qty: 90 TABLET | Refills: 1 | Status: SHIPPED | OUTPATIENT
Start: 2019-01-25 | End: 2019-07-17

## 2019-01-25 RX ORDER — METOPROLOL SUCCINATE 25 MG/1
37.5 TABLET, EXTENDED RELEASE ORAL DAILY
Qty: 90 TABLET | Refills: 0 | COMMUNITY
Start: 2019-01-25 | End: 2019-01-25

## 2019-01-25 ASSESSMENT — MIFFLIN-ST. JEOR: SCORE: 1135.45

## 2019-01-25 NOTE — LETTER
My Asthma Action Plan  Name: Raissa Encarnacion   YOB: 1945  Date: 1/25/2019   My doctor: Hayley Francisco MD   My clinic: [facility]        My Control Medicine: None  My Rescue Medicine: Albuterol nebulizer solution every 4 hours as needed  Albuterol (Proair/Ventolin/Proventil) inhaler two puffs every 6 hours as needed   My Asthma Severity: intermittent  Avoid your asthma triggers: Patient is unaware of triggers               GREEN ZONE   Good Control    I feel good    No cough or wheeze    Can work, sleep and play without asthma symptoms       Take your asthma control medicine every day.     1. If exercise triggers your asthma, take your rescue medication    15 minutes before exercise or sports, and    During exercise if you have asthma symptoms  2. Spacer to use with inhaler: If you have a spacer, make sure to use it with your inhaler             YELLOW ZONE Getting Worse  I have ANY of these:    I do not feel good    Cough or wheeze    Chest feels tight    Wake up at night   1. Keep taking your Green Zone medications  2. Start taking your rescue medicine:    every 20 minutes for up to 1 hour. Then every 4 hours for 24-48 hours.  3. If you stay in the Yellow Zone for more than 12-24 hours, contact your doctor.  4. If you do not return to the Green Zone in 12-24 hours or you get worse, start taking your oral steroid medicine if prescribed by your provider.           RED ZONE Medical Alert - Get Help  I have ANY of these:    I feel awful    Medicine is not helping    Breathing getting harder    Trouble walking or talking    Nose opens wide to breathe       1. Take your rescue medicine NOW  2. If your provider has prescribed an oral steroid medicine, start taking it NOW  3. Call your doctor NOW  4. If you are still in the Red Zone after 20 minutes and you have not reached your doctor:    Take your rescue medicine again and    Call 911 or go to the emergency room right  away    See your regular doctor within 2 weeks of an Emergency Room or Urgent Care visit for follow-up treatment.          Annual Reminders:  Meet with Asthma Educator,  Flu Shot in the Fall, consider Pneumonia Vaccination for patients with asthma (aged 19 and older).    [facility] [id number] [place] [address] [id number] AT [facility] [address]                      Asthma Triggers  How To Control Things That Make Your Asthma Worse    Triggers are things that make your asthma worse.  Look at the list below to help you find your triggers and what you can do about them.  You can help prevent asthma flare-ups by staying away from your triggers.      Trigger                                                          What you can do   Cigarette Smoke  Tobacco smoke can make asthma worse. Do not allow smoking in your home, car or around you.  Be sure no one smokes at a child s day care or school.  If you smoke, ask your health care provider for ways to help you quit.  Ask family members to quit too.  Ask your health care provider for a referral to Quit Plan to help you quit smoking, or call 9-976-654-PLAN.     Colds, Flu, Bronchitis  These are common triggers of asthma. Wash your hands often.  Don t touch your eyes, nose or mouth.  Get a flu shot every year.     Dust Mites  These are tiny bugs that live in cloth or carpet. They are too small to see. Wash sheets and blankets in hot water every week.   Encase pillows and mattress in dust mite proof covers.  Avoid having carpet if you can. If you have carpet, vacuum weekly.   Use a dust mask and HEPA vacuum.   Pollen and Outdoor Mold  Some people are allergic to trees, grass, or weed pollen, or molds. Try to keep your windows closed.  Limit time out doors when pollen count is high.   Ask you health care provider about taking medicine during allergy season.     Animal Dander  Some people are allergic to skin flakes, urine or saliva from pets  with fur or feathers. Keep pets with fur or feathers out of your home.    If you can t keep the pet outdoors, then keep the pet out of your bedroom.  Keep the bedroom door closed.  Keep pets off cloth furniture and away from stuffed toys.     Mice, Rats, and Cockroaches  Some people are allergic to the waste from these pests.   Cover food and garbage.  Clean up spills and food crumbs.  Store grease in the refrigerator.   Keep food out of the bedroom.   Indoor Mold  This can be a trigger if your home has high moisture. Fix leaking faucets, pipes, or other sources of water.   Clean moldy surfaces.  Dehumidify basement if it is damp and smelly.   Smoke, Strong Odors, and Sprays  These can reduce air quality. Stay away from strong odors and sprays, such as perfume, powder, hair spray, paints, smoke incense, paint, cleaning products, candles and new carpet.   Exercise or Sports  Some people with asthma have this trigger. Be active!  Ask your doctor about taking medicine before sports or exercise to prevent symptoms.    Warm up for 5-10 minutes before and after sports or exercise.     Other Triggers of Asthma  Cold air:  Cover your nose and mouth with a scarf.  Sometimes laughing or crying can be a trigger.  Some medicines and food can trigger asthma.

## 2019-01-25 NOTE — LETTER
[facility]  [address], [address]  [place] [address]  [phone]        January 28, 2019    Raissa Encarnacion  [address]  [place] [address]            Dear Ms. Raisas Encarnacion:    The recent blood tests results are in acceptable limits, but high cholesterol numbers.  Diet and exercise will help with these numbers.    Sincerely,    Hayley Story MD  Internal Medicine    These are some general explanations for tests  WBC means White Blood Cells  Platelets are small blood cells that help with forming the blood clots along with other blood factors.  Electrolytes are Sodium, Potassium, Calcium, Magnesium, Phosphorus.  Liver tests are: AST, ALT, Bilirubin, Alkaline Phosphatase.  Kidney tests are Creatinine, GFR.  HDL Cholesterol - is the good cholesterol and it is good to have it high.  LDL cholesterol is the bad cholesterol and it is good to have it low.  It is recommended to have LDL less than 130 for people with hypertension and to have it less than 100 for people with heart disease, diabetes and chronic kidney disease.  Thyroid tests are TSH, T4, T3  A1c is a test that gives us an idea about how well was controlled the diabetes for the last 3 months.

## 2019-01-25 NOTE — PATIENT INSTRUCTIONS
Plan:  1. Labs today   2. Take pantoprazole twice a day - better to take it 30 min before a meal. In a week try to stop the Carafate. If again symptoms, resume the Carafate and decrease the pantoprazole to once a day   3. Continue the other meds, same doses for now.  4. Follow up 6 months

## 2019-01-25 NOTE — NURSING NOTE
/70 (BP Location: Left arm, Patient Position: Sitting, Cuff Size: Adult Large)   Pulse 60   Temp 97.8  F (36.6  C) (Oral)   Resp 18   Ht 1.524 m (5')   Wt 71.9 kg (158 lb 8 oz)   LMP  (LMP Unknown)   SpO2 98%   Breastfeeding? No   BMI 30.95 kg/m    Mammogram and colonoscopy discussed again today, pt again declines. She does not plan to do these now, nor in the future. No need to send her reminder letters or call because she will not do these.   Vicki Nguyen CMA

## 2019-01-25 NOTE — PROGRESS NOTES
Dr Story's note      Patient's instructions / PLAN:                                                        Plan:  1. Labs today   2. Take pantoprazole twice a day - better to take it 30 min before a meal. In a week try to stop the Carafate. If again symptoms, resume the Carafate and decrease the pantoprazole to once a day   3. Continue the other meds, same doses for now.  4. Follow up 6 months      ASSESSMENT & PLAN:                                                        (K21.9) Gastroesophageal reflux disease without esophagitis  (primary encounter diagnosis)  (R10.13) Abdominal pain, epigastric  Comment: Not controlled   Plan: sucralfate (CARAFATE) 1 GM tablet, pantoprazole        (PROTONIX) 40 MG EC tablet        as above     (H04.123) Dry eyes  Comment:   Plan: carboxymethylcellulose (REFRESH PLUS) 0.5 %         SOLN ophthalmic solution            (Z95.2) S/P aortic valve replacement Meng, life long ASA,   Comment:   Plan: Comprehensive metabolic panel, CBC with         platelets, Lipid panel reflex to direct LDL         Fasting, metoprolol succinate ER (TOPROL-XL) 25        MG 24 hr tablet, DISCONTINUED: metoprolol         succinate ER (TOPROL-XL) 25 MG 24 hr tablet            (E78.5) Hyperlipidemia LDL goal <130  Comment:   Plan: Comprehensive metabolic panel, CBC with         platelets, Lipid panel reflex to direct LDL         Fasting, TSH with free T4 reflex, atorvastatin         (LIPITOR) 20 MG tablet            (E55.9) Vitamin D deficiency  Comment:   Plan: Vitamin D Deficiency            (J45.909) Uncomplicated asthma  Comment:   Plan: Asthma Action Plan (AAP)            Chief complaint:                                                      Follow up chronic medical problems    Daughter translates    SUBJECTIVE:   Raissa Encarnacion is a 74 year old female who presents to clinic today for the following health issues:    GI:  -- -Daughter was wondering how long pt needs to be on the Carafate for? Says she  starts developing symptoms almost immediately after she stops taking it. She does admit that she thinks the only time it was taken on a continuous basis was for about one month (was mostly taking it PRN).   -- takes pantoprazole once a day  -- neg H Pylori 2/2018  -- she doesn't want EGD    S/p AoValve replacement   -- f/u w cardio once a year at [facility]  -- needs metoprolol and lipitor refills     Vaccines  -- she doesn't want them     Hyperlipidemia Follow-Up      Rate your low fat/cholesterol diet?: good    Taking statin?  Stopped about a month ago when she ran out.     Other lipid medications/supplements?:  none      Amount of exercise or physical activity: Very little. Walks outside during the warmer months.     Problems taking medications regularly: No    Medication side effects: none    Diet: low salt and low fat/cholesterol      Review of Systems:                                                      ROS: negative for fever, chills, cough, wheezes, chest pain, shortness of breath, vomiting, abdominal pain, leg swelling     A 10-point review of systems was obtained.  Those pertinent are above and in the in the Subjective section.  The rest of the systems are negative.           OBJECTIVE:             Physical exam:  Blood pressure 130/70, pulse 60, temperature 97.8  F (36.6  C), temperature source Oral, resp. rate 18, height 1.524 m (5'), weight 71.9 kg (158 lb 8 oz), SpO2 98 %, not currently breastfeeding.   NAD, appears comfortable  Skin: no rashes   Neck: supple, no JVD,  No thyroidmegaly. Lymph nodes nonpalpable cervical and supraclavicular.  Chest: clear to auscultation bilaterally, good respiratory effort  Heart: S1 S2, RRR, no mgr appreciated  Abdomen: soft, mild epig tender, no hepatosplenomegaly or masses appreciated, no abdominal bruit, present bowel sounds  Extremities: no edema,   Neurologic: A, Ox3, no focal signs appreciated    PMHx: reviewed  Past Medical History:   Diagnosis Date     Memory  deficit 8/14/2017     S/P aortic valve replacement [facility], life long ASA, Plavix 9/14/2016     Uncomplicated asthma       PSHx: reviewed  Past Surgical History:   Procedure Laterality Date     CARDIAC SURGERY      aortic valve replacement        Meds: reviewed  Current Outpatient Medications   Medication Sig Dispense Refill     albuterol (2.5 MG/3ML) 0.083% neb solution Take 1 vial (2.5 mg) by nebulization every 6 hours as needed for shortness of breath / dyspnea or wheezing 25 vial 1     albuterol (PROAIR HFA/PROVENTIL HFA/VENTOLIN HFA) 108 (90 BASE) MCG/ACT Inhaler Inhale 2 puffs into the lungs every 6 hours as needed for shortness of breath / dyspnea or wheezing 1 Inhaler 1     aspirin EC 81 MG tablet Take 81 mg by mouth       calcium carbonate (TUMS) 500 MG chewable tablet 1 tab q6h prn heartburn       carboxymethylcellulose (REFRESH PLUS) 0.5 % SOLN ophthalmic solution Place 1 drop into both eyes 3 times daily as needed for dry eyes 1 Bottle 11     cholecalciferol (VITAMIN D3) 1000 UNIT tablet Take 1,000 Units by mouth       Coenzyme Q10 (COQ10) 100 MG CAPS Take 1 capsule (100 mg) by mouth daily 30 capsule 3     loratadine (CLARITIN) 10 MG tablet Take 10 mg by mouth       metoprolol succinate ER (TOPROL-XL) 25 MG 24 hr tablet Take 1.5 tablets (37.5 mg) by mouth daily 90 tablet 0     Multiple Vitamin (MULTI-VITAMINS) TABS Take 1 tablet by mouth       order for DME Nebulizer machine 1 Device 0     oxybutynin (DITROPAN XL) 10 MG 24 hr tablet Take 1 tablet (10 mg) by mouth daily as needed for bladder spasms 90 tablet 1     pantoprazole (PROTONIX) 40 MG EC tablet Take 1 tablet (40 mg) by mouth daily 90 tablet 1     sucralfate (CARAFATE) 1 GM tablet Take 1 tablet (1 g) by mouth 3 times daily 90 tablet 3     atorvastatin (LIPITOR) 20 MG tablet Take 1 tablet (20 mg) by mouth daily 30 tablet 3       Soc Hx: reviewed  Fam Hx: reviewed          Hayley Story MD  Internal Medicine

## 2019-01-26 LAB
ALBUMIN SERPL-MCNC: 4 G/DL (ref 3.4–5)
ALP SERPL-CCNC: 97 U/L (ref 40–150)
ALT SERPL W P-5'-P-CCNC: 31 U/L (ref 0–50)
ANION GAP SERPL CALCULATED.3IONS-SCNC: 7 MMOL/L (ref 3–14)
AST SERPL W P-5'-P-CCNC: 24 U/L (ref 0–45)
BILIRUB SERPL-MCNC: 0.5 MG/DL (ref 0.2–1.3)
BUN SERPL-MCNC: 16 MG/DL (ref 7–30)
CALCIUM SERPL-MCNC: 9.5 MG/DL (ref 8.5–10.1)
CHLORIDE SERPL-SCNC: 107 MMOL/L (ref 94–109)
CHOLEST SERPL-MCNC: 231 MG/DL
CO2 SERPL-SCNC: 26 MMOL/L (ref 20–32)
CREAT SERPL-MCNC: 0.91 MG/DL (ref 0.52–1.04)
GFR SERPL CREATININE-BSD FRML MDRD: 62 ML/MIN/{1.73_M2}
GLUCOSE SERPL-MCNC: 96 MG/DL (ref 70–99)
HDLC SERPL-MCNC: 47 MG/DL
LDLC SERPL CALC-MCNC: 154 MG/DL
NONHDLC SERPL-MCNC: 184 MG/DL
POTASSIUM SERPL-SCNC: 4.7 MMOL/L (ref 3.4–5.3)
PROT SERPL-MCNC: 7.3 G/DL (ref 6.8–8.8)
SODIUM SERPL-SCNC: 140 MMOL/L (ref 133–144)
TRIGL SERPL-MCNC: 151 MG/DL
TSH SERPL DL<=0.005 MIU/L-ACNC: 1.32 MU/L (ref 0.4–4)

## 2019-01-26 ASSESSMENT — ASTHMA QUESTIONNAIRES: ACT_TOTALSCORE: 22

## 2019-01-28 LAB — DEPRECATED CALCIDIOL+CALCIFEROL SERPL-MC: 39 UG/L (ref 20–75)

## 2019-02-28 ENCOUNTER — PATIENT OUTREACH (OUTPATIENT)
Dept: GERIATRIC MEDICINE | Facility: CLINIC | Age: 74
End: 2019-02-28

## 2019-02-28 PROBLEM — Z76.89 HEALTH CARE HOME: Status: ACTIVE | Noted: 2017-09-07

## 2019-02-28 NOTE — PROGRESS NOTES
[facility] Care Coordination Contact    Six month telephone outreach with Dtr Cecelia hansen Tiarra Daisy.  Dtr states her mom is doing the same, but has not been out of the house due to the snow and cold weather.  Patient has been walking inside and out of the apartment.  Her mom has complained of chest palpations, Dtr stated it is time for her yearly visit with cardiology MD and she will make that appointment next week.      Member continues to be on Protonix and Carafate as writer discussed her last 2 visits were due to reflux.  Care Plan and CPS will be updated.  CC will again reach out in 6 months.     [facility] Six-Month Telephone Assessment        ER visits: No  Hospitalizations: No  TCU stays: No  Significant health status changes: None, see above  Falls/Injuries: No  ADL/IADL changes: No  Changes in services: No    Caregiver Assessment follow up:  N/a     Goals: See POC in chart for goal progress documentation.     Encouraged member to call CC with any questions or concerns in the meantime.       Mona Euceda RN Care Coordinator  [facility]  [phone]

## 2019-04-08 DIAGNOSIS — Z95.2 S/P AORTIC VALVE REPLACEMENT: ICD-10-CM

## 2019-04-08 NOTE — TELEPHONE ENCOUNTER
"Requested Prescriptions   Pending Prescriptions Disp Refills     metoprolol succinate ER (TOPROL-XL) 25 MG 24 hr tablet  Last Written Prescription Date:  1/25/19  Last Fill Quantity: 90,  # refills: 0   Last office visit: 1/25/2019 with prescribing provider:  Jez   Future Office Visit:     90 tablet 0     Sig: Take 1.5 tablets (37.5 mg) by mouth daily       Beta-Blockers Protocol Passed - 4/8/2019  3:21 PM        Passed - Blood pressure under 140/90 in past 12 months     BP Readings from Last 3 Encounters:   01/25/19 130/70   09/28/18 138/70   02/22/18 128/60                 Passed - Patient is age 6 or older        Passed - Recent (12 mo) or future (30 days) visit within the authorizing provider's specialty     Patient had office visit in the last 12 months or has a visit in the next 30 days with authorizing provider or within the authorizing provider's specialty.  See \"Patient Info\" tab in inbasket, or \"Choose Columns\" in Meds & Orders section of the refill encounter.              Passed - Medication is active on med list          "

## 2019-04-10 RX ORDER — METOPROLOL SUCCINATE 25 MG/1
37.5 TABLET, EXTENDED RELEASE ORAL DAILY
Qty: 90 TABLET | Refills: 2 | Status: SHIPPED | OUTPATIENT
Start: 2019-04-10 | End: 2019-11-11

## 2019-05-03 ENCOUNTER — OFFICE VISIT (OUTPATIENT)
Dept: INTERNAL MEDICINE | Facility: CLINIC | Age: 74
End: 2019-05-03
Payer: COMMERCIAL

## 2019-05-03 VITALS
BODY MASS INDEX: 31.2 KG/M2 | SYSTOLIC BLOOD PRESSURE: 118 MMHG | TEMPERATURE: 97.5 F | DIASTOLIC BLOOD PRESSURE: 60 MMHG | HEART RATE: 58 BPM | OXYGEN SATURATION: 100 % | RESPIRATION RATE: 18 BRPM | HEIGHT: 60 IN | WEIGHT: 158.9 LBS

## 2019-05-03 DIAGNOSIS — Z95.2 S/P AORTIC VALVE REPLACEMENT: ICD-10-CM

## 2019-05-03 DIAGNOSIS — E78.5 HYPERLIPIDEMIA LDL GOAL <130: ICD-10-CM

## 2019-05-03 DIAGNOSIS — M62.81 GENERALIZED MUSCLE WEAKNESS: Primary | ICD-10-CM

## 2019-05-03 LAB
ALBUMIN SERPL-MCNC: 3.8 G/DL (ref 3.4–5)
ALBUMIN UR-MCNC: NEGATIVE MG/DL
ALP SERPL-CCNC: 90 U/L (ref 40–150)
ALT SERPL W P-5'-P-CCNC: 19 U/L (ref 0–50)
ANION GAP SERPL CALCULATED.3IONS-SCNC: 5 MMOL/L (ref 3–14)
APPEARANCE UR: CLEAR
AST SERPL W P-5'-P-CCNC: 18 U/L (ref 0–45)
BACTERIA #/AREA URNS HPF: ABNORMAL /HPF
BILIRUB SERPL-MCNC: 0.8 MG/DL (ref 0.2–1.3)
BILIRUB UR QL STRIP: NEGATIVE
BUN SERPL-MCNC: 15 MG/DL (ref 7–30)
CALCIUM SERPL-MCNC: 9.4 MG/DL (ref 8.5–10.1)
CHLORIDE SERPL-SCNC: 108 MMOL/L (ref 94–109)
CHOLEST SERPL-MCNC: 140 MG/DL
CK SERPL-CCNC: 90 U/L (ref 30–225)
CO2 SERPL-SCNC: 28 MMOL/L (ref 20–32)
COLOR UR AUTO: YELLOW
CREAT SERPL-MCNC: 1.06 MG/DL (ref 0.52–1.04)
ERYTHROCYTE [DISTWIDTH] IN BLOOD BY AUTOMATED COUNT: 13.2 % (ref 10–15)
GFR SERPL CREATININE-BSD FRML MDRD: 51 ML/MIN/{1.73_M2}
GLUCOSE SERPL-MCNC: 97 MG/DL (ref 70–99)
GLUCOSE UR STRIP-MCNC: NEGATIVE MG/DL
HCT VFR BLD AUTO: 39.3 % (ref 35–47)
HDLC SERPL-MCNC: 49 MG/DL
HGB BLD-MCNC: 12.9 G/DL (ref 11.7–15.7)
HGB UR QL STRIP: ABNORMAL
KETONES UR STRIP-MCNC: NEGATIVE MG/DL
LDLC SERPL CALC-MCNC: 69 MG/DL
LEUKOCYTE ESTERASE UR QL STRIP: ABNORMAL
MCH RBC QN AUTO: 29.1 PG (ref 26.5–33)
MCHC RBC AUTO-ENTMCNC: 32.8 G/DL (ref 31.5–36.5)
MCV RBC AUTO: 89 FL (ref 78–100)
NITRATE UR QL: NEGATIVE
NON-SQ EPI CELLS #/AREA URNS LPF: ABNORMAL /LPF
NONHDLC SERPL-MCNC: 91 MG/DL
PH UR STRIP: 8 PH (ref 5–7)
PLATELET # BLD AUTO: 167 10E9/L (ref 150–450)
POTASSIUM SERPL-SCNC: 4.7 MMOL/L (ref 3.4–5.3)
PROT SERPL-MCNC: 7.1 G/DL (ref 6.8–8.8)
RBC # BLD AUTO: 4.43 10E12/L (ref 3.8–5.2)
RBC #/AREA URNS AUTO: ABNORMAL /HPF
SODIUM SERPL-SCNC: 141 MMOL/L (ref 133–144)
SOURCE: ABNORMAL
SP GR UR STRIP: 1.01 (ref 1–1.03)
TRIGL SERPL-MCNC: 111 MG/DL
TSH SERPL DL<=0.005 MIU/L-ACNC: 1 MU/L (ref 0.4–4)
UROBILINOGEN UR STRIP-ACNC: 0.2 EU/DL (ref 0.2–1)
WBC # BLD AUTO: 8 10E9/L (ref 4–11)
WBC #/AREA URNS AUTO: ABNORMAL /HPF

## 2019-05-03 PROCEDURE — 85027 COMPLETE CBC AUTOMATED: CPT | Performed by: INTERNAL MEDICINE

## 2019-05-03 PROCEDURE — 36415 COLL VENOUS BLD VENIPUNCTURE: CPT | Performed by: INTERNAL MEDICINE

## 2019-05-03 PROCEDURE — 80053 COMPREHEN METABOLIC PANEL: CPT | Performed by: INTERNAL MEDICINE

## 2019-05-03 PROCEDURE — 84443 ASSAY THYROID STIM HORMONE: CPT | Performed by: INTERNAL MEDICINE

## 2019-05-03 PROCEDURE — 99214 OFFICE O/P EST MOD 30 MIN: CPT | Performed by: INTERNAL MEDICINE

## 2019-05-03 PROCEDURE — 82550 ASSAY OF CK (CPK): CPT | Performed by: INTERNAL MEDICINE

## 2019-05-03 PROCEDURE — 81001 URINALYSIS AUTO W/SCOPE: CPT | Performed by: INTERNAL MEDICINE

## 2019-05-03 PROCEDURE — 80061 LIPID PANEL: CPT | Performed by: INTERNAL MEDICINE

## 2019-05-03 ASSESSMENT — MIFFLIN-ST. JEOR: SCORE: 1137.27

## 2019-05-03 NOTE — PROGRESS NOTES
Dr Story's note      Patient's instructions / PLAN:                                                        Plan:  1. Stop the Atorvastatin for 3-4 weeks   2. Decrease the Metoprolol form 1.5 tablet to 1 tablet daily  3. Continue the other meds, same doses for now.  4. Labs today - [address]  [address]  5. Follow up in 3 months  6. physical therapy referral         ASSESSMENT & PLAN:                                                      (M62.81) Generalized  weakness  (primary encounter diagnosis)  Comment: She might have deconditioning from the long winter, she might have mild side effects from the Lipitor.  The heart rate is on the low normal side.  Plan: Comprehensive metabolic panel, CBC with         platelets, Lipid panel reflex to direct LDL         Fasting, TSH with free T4 reflex, UA with         Microscopic reflex to Culture, CK total, AVTAR         PT, HAND, AND CHIROPRACTIC REFERRAL        as above     (Z95.2) S/P aortic valve replacement Meng, life long ASA,   Comment:   Plan: Comprehensive metabolic panel, CBC with         platelets, Lipid panel reflex to direct LDL         Fasting, TSH with free T4 reflex, UA with         Microscopic reflex to Culture, CK total            (E78.5) Hyperlipidemia LDL goal <130  Comment:   Plan: Comprehensive metabolic panel, Lipid panel         reflex to direct LDL Fasting               Chief complaint:                                                      Fatigue  Follow up chronic medical problems    Due to language barrier, an  was present during the history-taking and subsequent discussion (and for part of the physical exam) with this patient.   Daughter is present.  Her English is fluent.    SUBJECTIVE:   Raissa Encarnacion is a 74 year old female who presents to clinic today for the following   health issues:    Daughter noticed that mom has the generalized fatigue for the last 2 to 3 months and she looked very exhausted when she was walking.  Because of her cardiac  DISPLAY PLAN FREE TEXT history, she called the cardiology and she had an echocardiogram.  She received a phone call that the echocardiogram is in the same range as before.  She has a follow-up appointment scheduled with cardiology in June.    I reviewed the results in care everywhere:  -- She had echo Care everywhere echo reviewed - in the same range. Nothing to explain the SOB and the weakness.   -- Labs jan : normal CBC and CMP and TSH. High CHol . Lipitor started in jan    I asked her to walk down the corridor.  She walked for about 20 yards. She walks slowly but no gait problem. HR 58 to 67. POx 98%    No particular pain, but she feels weak. Poor appetite       Review of Systems:                                                      ROS: negative for fever, chills, cough, wheezes, chest pain, shortness of breath, vomiting, abdominal pain, leg swelling       OBJECTIVE:             Physical exam:   Blood pressure 118/60, pulse 58, temperature 97.5  F (36.4  C), temperature source Oral, resp. rate 18, height 1.524 m (5'), weight 72.1 kg (158 lb 14.4 oz), SpO2 100 %, not currently breastfeeding.     NAD, appears comfortable  Skin: no rashes   HEENT: PERRLA, EOMI, pink conjunctiva, anicteric sclerae, bilateral tympanic membranes are clinically normal, oropharynx is normal color  Neck: supple, no JVD,  No thyroidmegaly. Lymph nodes nonpalpable cervical and supraclavicular.  Chest: clear to auscultation bilaterally, good respiratory effort  Heart: S1 S2, RRR, no mgr appreciated  Abdomen: soft, not tender, no hepatosplenomegaly or masses appreciated, no abdominal bruit, present bowel sounds  Extremities: no edema,   Neurologic: A, Ox3, no focal signs appreciated    PMHx: reviewed  Past Medical History:   Diagnosis Date     Memory deficit 8/14/2017     S/P aortic valve replacement Abbott, life long ASA, Plavix 9/14/2016     Uncomplicated asthma       PSHx: reviewed  Past Surgical History:   Procedure Laterality Date     CARDIAC SURGERY       aortic valve replacement        Meds: reviewed  Current Outpatient Medications   Medication Sig Dispense Refill     albuterol (2.5 MG/3ML) 0.083% neb solution Take 1 vial (2.5 mg) by nebulization every 6 hours as needed for shortness of breath / dyspnea or wheezing 25 vial 1     albuterol (PROAIR HFA/PROVENTIL HFA/VENTOLIN HFA) 108 (90 BASE) MCG/ACT Inhaler Inhale 2 puffs into the lungs every 6 hours as needed for shortness of breath / dyspnea or wheezing 1 Inhaler 1     aspirin EC 81 MG tablet Take 81 mg by mouth       atorvastatin (LIPITOR) 20 MG tablet Take 1 tablet (20 mg) by mouth daily 90 tablet 1     calcium carbonate (TUMS) 500 MG chewable tablet 1 tab q6h prn heartburn       carboxymethylcellulose (REFRESH PLUS) 0.5 % SOLN ophthalmic solution Place 1 drop into both eyes 3 times daily as needed for dry eyes 1 Bottle 11     cholecalciferol (VITAMIN D3) 1000 UNIT tablet Take 1,000 Units by mouth       Coenzyme Q10 (COQ10) 100 MG CAPS Take 1 capsule (100 mg) by mouth daily 30 capsule 3     loratadine (CLARITIN) 10 MG tablet Take 10 mg by mouth       metoprolol succinate ER (TOPROL-XL) 25 MG 24 hr tablet Take 1.5 tablets (37.5 mg) by mouth daily 90 tablet 2     Multiple Vitamin (MULTI-VITAMINS) TABS Take 1 tablet by mouth       order for DME Nebulizer machine 1 Device 0     oxybutynin (DITROPAN XL) 10 MG 24 hr tablet Take 1 tablet (10 mg) by mouth daily as needed for bladder spasms 90 tablet 1     pantoprazole (PROTONIX) 40 MG EC tablet Take 1 tablet (40 mg) by mouth 2 times daily (before meals) 180 tablet 1     sucralfate (CARAFATE) 1 GM tablet Take 1 tablet (1 g) by mouth 3 times daily 90 tablet 5       Soc Hx: reviewed  Fam Hx: reviewed          Hayley Story MD  Internal Medicine

## 2019-05-03 NOTE — LETTER
[facility]  [address], [address]  [place] [address]  [phone]        May 6, 2019    Raissa Encarnacion  [address] [address]   [place] [address]            Dear Ms. Raissa Encarnacion:    These are the recent blood test results.  They are in acceptable range.  The urine test does not show signs for infection.  If any new urine symptoms, we should recheck the urine.    Sincerely,    Hayley Story MD  Internal Medicine     These are some general explanations for tests  WBC means White Blood Cells  Platelets are small blood cells that help with forming the blood clots along with other blood factors.  Electrolytes are Sodium, Potassium, Calcium, Magnesium, Phosphorus.  Liver tests are: AST, ALT, Bilirubin, Alkaline Phosphatase.  Kidney tests are Creatinine, GFR.  HDL Cholesterol - is the good cholesterol and it is good to have it high.  LDL cholesterol is the bad cholesterol and it is good to have it low.  It is recommended to have LDL less than 130 for people with hypertension and to have it less than 100 for people with heart disease, diabetes and chronic kidney disease.  Thyroid tests are TSH, T4, T3  A1c is a test that gives us an idea about how well was controlled the diabetes for the last 3 months.

## 2019-05-03 NOTE — NURSING NOTE
/62 (BP Location: Left arm, Patient Position: Sitting, Cuff Size: Adult Large)   Pulse 58   Temp 97.5  F (36.4  C) (Oral)   Resp 18   Ht 1.524 m (5')   Wt 72.1 kg (158 lb 14.4 oz)   LMP  (LMP Unknown)   SpO2 100%   Breastfeeding? No   BMI 31.03 kg/m    Vicki Nguyen CMA

## 2019-05-03 NOTE — PATIENT INSTRUCTIONS
Plan:  1. Stop the Atorvastatin for 3-4 weeks   2. Decrease the Metoprolol form 1.5 tablet to 1 tablet daily  3. Continue the other meds, same doses for now.  4. Labs today - [address]  [address]  5. Follow up in 3 months  6. physical therapy referral

## 2019-05-04 ASSESSMENT — ASTHMA QUESTIONNAIRES: ACT_TOTALSCORE: 20

## 2019-05-05 PROBLEM — Z71.89 ADVANCED DIRECTIVES, COUNSELING/DISCUSSION: Chronic | Status: ACTIVE | Noted: 2017-09-21

## 2019-05-09 DIAGNOSIS — J30.2 SEASONAL ALLERGIC RHINITIS, UNSPECIFIED TRIGGER: Primary | ICD-10-CM

## 2019-05-09 NOTE — TELEPHONE ENCOUNTER
Requested Prescriptions   Pending Prescriptions Disp Refills     loratadine (CLARITIN) 10 MG tablet 90 tablet 0     Sig: Take 1 tablet (10 mg) by mouth daily       There is no refill protocol information for this order      Last Written Prescription Date:  historic  Last Fill Quantity: n/a,  # refills: n/a   Last office visit: 5/3/2019 with prescribing provider:     Future Office Visit:

## 2019-05-10 NOTE — TELEPHONE ENCOUNTER
"Requested Prescriptions   Pending Prescriptions Disp Refills     loratadine (CLARITIN) 10 MG tablet 90 tablet 0     Sig: Take 1 tablet (10 mg) by mouth daily       Antihistamines Protocol Failed - 5/9/2019  1:35 PM        Failed - Patient is 3-64 years of age     Apply weight-based dosing for peds patients age 3 - 12 years of age.    Forward request to provider for patients under the age of 3 or over the age of 64.          Passed - Recent (12 mo) or future (30 days) visit within the authorizing provider's specialty     Patient had office visit in the last 12 months or has a visit in the next 30 days with authorizing provider or within the authorizing provider's specialty.  See \"Patient Info\" tab in inbasket, or \"Choose Columns\" in Meds & Orders section of the refill encounter.              Passed - Medication is active on med list          Routing refill request to provider for review/approval because:  Protocol failed d/t patient's age.    Please advise, thanks.          "

## 2019-05-13 RX ORDER — LORATADINE 10 MG/1
10 TABLET ORAL DAILY
Qty: 90 TABLET | Refills: 3 | Status: SHIPPED | OUTPATIENT
Start: 2019-05-13

## 2019-07-16 DIAGNOSIS — E78.5 HYPERLIPIDEMIA LDL GOAL <130: ICD-10-CM

## 2019-07-16 DIAGNOSIS — R10.13 ABDOMINAL PAIN, EPIGASTRIC: ICD-10-CM

## 2019-07-16 NOTE — TELEPHONE ENCOUNTER
"Requested Prescriptions   Pending Prescriptions Disp Refills     atorvastatin (LIPITOR) 20 MG   Last Written Prescription Date:  1/25/19  Last Fill Quantity: 90,  # refills: 1   Last office visit: 5/3/2019 with prescribing provider:  5/3/19  90 tablet 1     Sig: Take 1 tablet (20 mg) by mouth daily       Statins Protocol Passed - 7/16/2019  9:40 AM        Passed - LDL on file in past 12 months     Recent Labs   Lab Test 05/03/19  0955   LDL 69             Passed - No abnormal creatine kinase in past 12 months     Recent Labs   Lab Test 05/03/19  0955   CKT 90                Passed - Recent (12 mo) or future (30 days) visit within the authorizing provider's specialty     Patient had office visit in the last 12 months or has a visit in the next 30 days with authorizing provider or within the authorizing provider's specialty.  See \"Patient Info\" tab in inbasket, or \"Choose Columns\" in Meds & Orders section of the refill encounter.              Passed - Medication is active on med list        Passed - Patient is age 18 or older        Passed - No active pregnancy on record        Passed - No positive pregnancy test in past 12 months        pantoprazole (PROTONIX) 40 MG EC tablet  Last Written Prescription Date:  1/25/19  Last Fill Quantity: 180,  # refills: 1   Last office visit: 5/3/2019 with prescribing provider:  5/3/19   Future Office Visit:     180 tablet 1     Sig: Take 1 tablet (40 mg) by mouth 2 times daily (before meals)       PPI Protocol Passed - 7/16/2019  9:40 AM        Passed - Not on Clopidogrel (unless Pantoprazole ordered)        Passed - No diagnosis of osteoporosis on record        Passed - Recent (12 mo) or future (30 days) visit within the authorizing provider's specialty     Patient had office visit in the last 12 months or has a visit in the next 30 days with authorizing provider or within the authorizing provider's specialty.  See \"Patient Info\" tab in inbasket, or \"Choose Columns\" in Meds & " Orders section of the refill encounter.              Passed - Medication is active on med list        Passed - Patient is age 18 or older        Passed - No active pregnacy on record        Passed - No positive pregnancy test in past 12 months

## 2019-07-16 NOTE — LETTER
[place] [address]  [facility] [address]  Phone: [phone]        July 17, 2019      Raissa Encarnacion                                                                                                                                [address] [address] [address]            Dear Ms. Encarnacion,    We are concerned about your health care.  We recently provided you with a medication refill.  Many medications require routine follow-up with your Doctor.      At this time we ask that: You schedule a routine office visit with your physician in August 2019.    Your prescription: Has been refilled once so you may have time for the above noted follow-up.      Thank you,    [facility]

## 2019-07-17 RX ORDER — ATORVASTATIN CALCIUM 20 MG/1
20 TABLET, FILM COATED ORAL DAILY
Qty: 90 TABLET | Refills: 0 | Status: SHIPPED | OUTPATIENT
Start: 2019-07-17 | End: 2019-11-11 | Stop reason: ALTCHOICE

## 2019-07-17 RX ORDER — PANTOPRAZOLE SODIUM 40 MG/1
40 TABLET, DELAYED RELEASE ORAL
Qty: 180 TABLET | Refills: 0 | Status: SHIPPED | OUTPATIENT
Start: 2019-07-17 | End: 2019-10-26

## 2019-07-17 NOTE — TELEPHONE ENCOUNTER
Atorvastatin & Pantoprazole  Medication is being filled for 1 time refill only due to:  Per 5/3/19 office visit note, patient advised to follow up in three months    No future appointments scheduled.

## 2019-08-06 ENCOUNTER — PATIENT OUTREACH (OUTPATIENT)
Dept: GERIATRIC MEDICINE | Facility: CLINIC | Age: 74
End: 2019-08-06

## 2019-08-06 NOTE — PROGRESS NOTES
L/m for Cecelia, daughter, [phone]. Asked for a call back to schedule annual home visit. PC from Cecelia. Schedule annual visit for 8/13 at 10am.    OTIS Carr  [facility]   [phone] (fax)  [email or web address]

## 2019-08-13 ENCOUNTER — PATIENT OUTREACH (OUTPATIENT)
Dept: GERIATRIC MEDICINE | Facility: CLINIC | Age: 74
End: 2019-08-13

## 2019-08-13 ASSESSMENT — ACTIVITIES OF DAILY LIVING (ADL)
DEPENDENT_IADLS:: CLEANING;COOKING;LAUNDRY;SHOPPING;MEAL PREPARATION;MEDICATION MANAGEMENT;MONEY MANAGEMENT;TRANSPORTATION;INCONTINENCE

## 2019-08-13 NOTE — PROGRESS NOTES
[facility] Care Coordination Contact    [facility] Home Visit Assessment     Home visit for Health Risk Assessment with Raissa Encarnacion completed on August 13, 2019    Type of residence:: Apartment  Current living arrangement:: I live in a private home with family     Assessment completed with:: Patient, Care Team Member, Children(daughter Cceelia, care coordinator Tiarra, and )    Current Care Plan  Member currently receiving the following home care services:   None.  Member currently receiving the following community resources: PCA, Housekeeping/Chore Agency, elderly waiver    Medication Review  Medication reconciliation completed in Epic: Yes  Medication set-up & administration: Family/informal caregiver sets up weekly.  Family caregiver administers medications.  Medication Risk Assessment Medication (1 or more, place referral to MTM): N/A: No risk factors identified  MTM Referral Placed: No: No risk factors idenified    Mental/Behavioral Health   Depression Screening: See PHQ assessment flowsheet.          Mental Health Diagnosis: No  Mental Health Services: None: No further intervention needed at this time.    Falls Assessment:   Fallen 2 or more times in the past year?: No   Any fall with injury in the past year?: No    ADL/IADL Dependencies:   Dependent ADLs:: Bathing, Dressing, Grooming, Toileting, Ambulation-no assistive device  Dependent IADLs:: Cleaning, Cooking, Laundry, Shopping, Meal Preparation, Medication Management, Money Management, Transportation, Incontinence    [facility] Plan sponsored benefits: Shared information re: Silver Sneakers/gym memberships, ASA, Calcium +D.    PCA Assessment completed at visit: Yes     Elderly Waiver Eligibility: Yes-will continue on EW    Care Plan & Recommendations: Continue with elderly waiver, PCA services, and homemaking.  Wants to discontinue lifeline.  Requesting pad samples.  Needs list of eye care clinics.     See [facility] for detailed  assessment information.    Follow-Up Plan: Member informed of future contact, plan to f/u with member with a 6 month telephone assessment.  Contact information shared with member and family, encouraged member to call with any questions or concerns at any time.    [place] care continuum providers: Please refer to Health Care Home on the [facility] Problem List to view this patient's [facility] Care Plan Summary.    OTIS Carr  [facility]   [phone] (fax)  [email or web address]

## 2019-08-13 NOTE — PROGRESS NOTES
Ordered pad samples from [facility].    Cancelled [facility] per member request. Spoke to Jacey at  [employer] who will reach out to daughter to inform of process of returning equipment.     L/M for Cecelia letting her know that we had completed the HCD in home in 2017. She was going to take it to the clinic to have it scanned it.  It is not on file. Asked that she take it to the clinic or mail it to me so it can get scanned if. If she needs a new form I can send her one.    OTIS Carr  [facility]   [phone] (fax)  [email or web address]

## 2019-08-19 DIAGNOSIS — J45.20 MILD INTERMITTENT ASTHMA, UNSPECIFIED WHETHER COMPLICATED: ICD-10-CM

## 2019-08-19 RX ORDER — ALBUTEROL SULFATE 0.83 MG/ML
2.5 SOLUTION RESPIRATORY (INHALATION) EVERY 6 HOURS PRN
Qty: 25 VIAL | Refills: 1 | Status: SHIPPED | OUTPATIENT
Start: 2019-08-19 | End: 2019-11-11

## 2019-08-19 RX ORDER — ALBUTEROL SULFATE 90 UG/1
2 AEROSOL, METERED RESPIRATORY (INHALATION) EVERY 6 HOURS PRN
Qty: 1 INHALER | Refills: 1 | Status: SHIPPED | OUTPATIENT
Start: 2019-08-19 | End: 2019-10-26

## 2019-08-19 RX ORDER — ALBUTEROL SULFATE 0.83 MG/ML
2.5 SOLUTION RESPIRATORY (INHALATION) EVERY 6 HOURS PRN
Qty: 25 VIAL | Refills: 1 | Status: CANCELLED | OUTPATIENT
Start: 2019-08-19

## 2019-08-19 NOTE — TELEPHONE ENCOUNTER
"Daughter at clinic wanting to talk to a nurse.  Patient had a bad asthma attack last evening and daughter went to a pharmacy last evening to get an inhaler and they said she didn't have any refills left and  would not let her buy one. Stated a family member  recently from an asthma attack in [place].      Requested Prescriptions   Pending Prescriptions Disp Refills     albuterol (PROAIR HFA/PROVENTIL HFA/VENTOLIN HFA) 108 (90 Base) MCG/ACT inhaler 1 Inhaler 1     Sig: Inhale 2 puffs into the lungs every 6 hours as needed for shortness of breath / dyspnea or wheezing       Asthma Maintenance Inhalers - Anticholinergics Passed - 2019  1:46 PM        Passed - Patient is age 12 years or older        Passed - Asthma control assessment score within normal limits in last 6 months     Please review ACT score.           Passed - Medication is active on med list        Passed - Recent (6 mo) or future (30 days) visit within the authorizing provider's specialty     Patient had office visit in the last 6 months or has a visit in the next 30 days with authorizing provider or within the authorizing provider's specialty.  See \"Patient Info\" tab in inbasket, or \"Choose Columns\" in Meds & Orders section of the refill encounter.            Prescription approved per [facility] Refill Protocol.          "

## 2019-08-23 NOTE — PROGRESS NOTES
Per Levar at [facility], pad samples were delivered on 8/15/19. CC notified.    Raissa Encarnacion 1945 Pad Samples 8/13/2019 Delivered 8/15/19     Nicole Greene  Care Management Specialist  [facility]  [phone]

## 2019-08-27 ENCOUNTER — PATIENT OUTREACH (OUTPATIENT)
Dept: GERIATRIC MEDICINE | Facility: CLINIC | Age: 74
End: 2019-08-27

## 2019-08-27 NOTE — PROGRESS NOTES
[facility] Care Coordination Contact    [employer]:  Faxed completed PCA assessment to [facility] Agency and mailed copies to member.  Faxed MD Communication to PCP.  Emailed referral form for auth to [employer].    Nicole Greene  Care Management Specialist  [facility]  [phone]

## 2019-08-27 NOTE — LETTER
[facility]  [address] [place] [address], [address]  [place] [address]  Phone:  [phone]  Fax:  [phone]        August 27, 2019    KLAUDIA RAPP  [address]  [place] [address]    Dear Klaudia,    Ankita is a copy of your completed PCA Assessment and Service Plan.  This is for your records and no action is required by you.  If you have additional questions regarding your assessment please contact me at [phone]. If you feel that your needs are not being met, please contact the Clinical Supervisor at [phone].    Sincerely,    OTIS Carr    E-mail: [email or web address]  Phone: [phone]      [facility]              Enclosure:  Completed PCA assessment

## 2019-09-11 ENCOUNTER — PATIENT OUTREACH (OUTPATIENT)
Dept: GERIATRIC MEDICINE | Facility: CLINIC | Age: 74
End: 2019-09-11

## 2019-09-11 NOTE — LETTER
September 11, 2019    Important [employer] Information    KLAUDIA DIONTE  [address]  [place] [address]  Your Care Plan  Dear Klaudia,  When we spoke recently, I promised to send you a Care Plan. The plan enclosed is a summary of our discussion. It includes the steps we agreed would help you meet your health goals. In addition, I can help you with:  [employer]  This program is available to members who need a ride to medical and dental visits. To schedule a ride, call [phone] or [phone] (toll free). TTY/TTD: 711. You can call 8 a.m. to 8 p.m. Seven days a week. Access to a representative may be limited at times.    [employer]   The [employer] program empowers you to improve your health through education and exercise. To learn more, visit [employer], or call [employer] at [phone] (toll free) (TTY:711) from 7 a.m. - 7 p.m. Central Time, Monday-Friday.  Health Care Directive   This form helps you outline your health care wishes. You can request a form from me and I will answer any questions you have before you discuss it with your doctor.   Annual Physical  Take a key step on your path to good health and set up an annual physical at your clinic.  Questions?  Call me at [phone] Monday-Friday between 8am and 5pm.  TTY/TTD: 711. As we discussed, I plan to be in touch with you again in 6 months to follow up via phone.  Sincerely,    OTIS Carr    E-mail: [email or web address]  Phone: [phone]      [employer]            cc: member records                    Civil Rights Notice  Discrimination is against the law. [employer] does not discriminate on the basis of any of the following:    Race    Color    National Origin    Creed    Christian    Age    Public Assistance Status    Receipt of Health Care Services    Disability (including physical or mental impairment)    Sex (including sex stereotypes and gender identity)    Marital  Status    Political Beliefs    Medical Condition    Genetic Information    Sexual Orientation    Claims Experience    Medical History    Health Status    Auxiliary Aids and Services:  [employer] provides auxiliary aids and services, like qualified interpreters or information in accessible formats, free of charge and in a timely manner, to ensure an equal opportunity to participate in our health care programs. Contact [employer] Customer Service at Transmit/contact medicaid or call [phone] (toll free); TTY:711 or at Transmit/contactmedicaid.    Language Assistance Services:  [employer] provides translated documents and spoken language interpreting, free of charge and in a timely manner, when language assistance services are necessary to ensure limited English speakers have meaningful access to our information and services. Contact [employer] at -[phone] (toll free); TTY: 195 or Transmit/contactmedicaid.     Civil Rights Complaints  You have the right to file a discrimination complaint if you believe you were treated in a discriminatory way by [employer]. You may contact any of the following four agencies directly to file a discrimination complaint.    U.S. Department of Health and Human Services  Office for Civil Rights (OCR)  You have the right to file a complaint with the OCR, a federal agency, if you believe you have been discriminated against because of any of the following:    Race    Disability    Color    Sex    National Origin    Age      Contact the OCR directly to file a complaint:         Director         U.S. Department of Health and Human Services  Office for Civil Rights         [address] [place] [address], [place] [address]         [phone] (Voice)         [phone] (TDD)         Complaint Portal - https://ocrportal.hhs.gov/ocr/portal/lobby.jsf     [place] Department of Human Rights (Formerly [facility])  In [place], you have the right to file a complaint with  the MDHR if you believe you have been discriminated against because of any of the following:      Race    Color    National Origin    Mormon    Creed    Sex    Sexual Orientation    Marital Status    Public Assistance Status    Disability    Contact the MDHR directly to file a complaint:         [place] Department of Human Rights         [facility], [address] [place] [address]         [phone] (voice)          [phone] (toll free)         711 or [phone] ([place] Relay)         [phone] (Fax)         [email or web address]. (Email)     [place] Department of Human Services (DHS)  You have the right to file a complaint with [facility] if you believe you have been discriminated against in our health care programs because of any of the following:    Race    Color    National Origin    Creed    Mormon    Age    Public Assistance Status    Receipt of Health Care Services    Disability (including physical or mental impairment)    Sex (including sex stereotypes and gender identity)    Marital Status    Political Beliefs    Medical Condition    Genetic Information    Sexual Orientation    Claims Experience    Medical History    Health Status    Complaints must be in writing and filed within 180 days of the date you discovered the alleged discrimination. The complaint must contain your name and address and describe the discrimination you are complaining about. After we get your complaint, we will review it and notify you in writing about whether we have authority to investigate. If we do, we will investigate the complaint.      [facility] will notify you in writing of the investigation s outcome. You have a right to appeal the outcome if you disagree with the decision. To appeal, you must send a written request to have [facility] review the investigation outcome period. Be brief and state why you disagree with the decision. Include additional information you think is important.      If you file a  complaint in this way, the people who work for the agency named in the complaint cannot retaliate against you. This means they cannot punish you in any way for filing a complaint. Filing a complaint in this way does not stop you from seeking out other legal or administration actions.     Contact DHS directly to file a discrimination complaint:        Civil Rights Coordinator        [facility] of Human Services        Equal Opportunity and Access Division        [address] [address]        [place] [address] [phone] (voice) or use your preferred relay service     [facility] Complaint Notice   You have the right to file a complaint with [facility] if you believe you have been discriminated against because of any of the following:       Medical condition    Health status    Receipt of health care services    Claims experience    Medical history    Genetic information    Disability (including mental or physical impairment)    Marital status    Age    Sex (including sex stereotypes and gender identity)    Sexual orientation    National origin    Race    Color    Jainism    Creed    Public assistance status    Political beliefs    You can file a complaint and ask for help in filing a complaint in person or by mail, phone, fax, or email at:     [facility] Civil Rights Coordinator  [facility]  [address], [place] [address] [phone] (voice and fax) or TTG:173  Email: [email or web address]    American Indians can continue to use [facility] and [facility] clinics. We will not require prior approval or impose any conditions for you to get services at these clinics. For elders age 65 years and older this includes Elderly Waiver (EW) services accessed through the [place]. If a doctor or other provider in a [facility] or [facility] clinic refers you to a provider in our network, we will not require you to see your primary care provider prior to the referral.    For  accessible formats of this publication or assistance with equal or access to our services, visit [email or web address], or call [phone] (toll free) or use your preferred relay service.

## 2019-10-25 DIAGNOSIS — R10.13 ABDOMINAL PAIN, EPIGASTRIC: ICD-10-CM

## 2019-10-25 DIAGNOSIS — J45.20 MILD INTERMITTENT ASTHMA, UNSPECIFIED WHETHER COMPLICATED: ICD-10-CM

## 2019-10-25 NOTE — TELEPHONE ENCOUNTER
"Requested Prescriptions   Pending Prescriptions Disp Refills     albuterol (PROAIR HFA/PROVENTIL HFA/VENTOLIN HFA) 108 (90 Base)  Last Written Prescription Date:  8/19/19  Last Fill Quantity: 1,  # refills: 1   Last office visit: 5/3/2019 with prescribing provider:  Jez Wan Office Visit:     MCG/ACT inhaler 1 Inhaler 1     Sig: Inhale 2 puffs into the lungs every 6 hours as needed for shortness of breath / dyspnea or wheezing       Asthma Maintenance Inhalers - Anticholinergics Passed - 10/25/2019 11:08 AM        Passed - Patient is age 12 years or older        Passed - Asthma control assessment score within normal limits in last 6 months     Please review ACT score.           Passed - Medication is active on med list        Passed - Recent (6 mo) or future (30 days) visit within the authorizing provider's specialty     Patient had office visit in the last 6 months or has a visit in the next 30 days with authorizing provider or within the authorizing provider's specialty.  See \"Patient Info\" tab in inbasket, or \"Choose Columns\" in Meds & Orders section of the refill encounter.            pantoprazole (PROTONIX) 40 MG EC tablet  Last Written Prescription Date:  7/17/19  Last Fill Quantity: 180,  # refills: 0   Last office visit: 5/3/2019 with prescribing provider:  Jez Wan Office Visit:     180 tablet 0     Sig: Take 1 tablet (40 mg) by mouth 2 times daily (before meals)       PPI Protocol Passed - 10/25/2019 11:08 AM        Passed - Not on Clopidogrel (unless Pantoprazole ordered)        Passed - No diagnosis of osteoporosis on record        Passed - Recent (12 mo) or future (30 days) visit within the authorizing provider's specialty     Patient has had an office visit with the authorizing provider or a provider within the authorizing providers department within the previous 12 mos or has a future within next 30 days. See \"Patient Info\" tab in inbasket, or \"Choose Columns\" in Meds & Orders section " of the refill encounter.              Passed - Medication is active on med list        Passed - Patient is age 18 or older        Passed - No active pregnacy on record        Passed - No positive pregnancy test in past 12 months

## 2019-10-26 RX ORDER — ALBUTEROL SULFATE 90 UG/1
2 AEROSOL, METERED RESPIRATORY (INHALATION) EVERY 6 HOURS PRN
Qty: 1 INHALER | Refills: 0 | Status: SHIPPED | OUTPATIENT
Start: 2019-10-26 | End: 2019-11-11

## 2019-10-26 RX ORDER — PANTOPRAZOLE SODIUM 40 MG/1
40 TABLET, DELAYED RELEASE ORAL
Qty: 180 TABLET | Refills: 0 | Status: SHIPPED | OUTPATIENT
Start: 2019-10-26 | End: 2019-11-11

## 2019-10-26 NOTE — TELEPHONE ENCOUNTER
Prescription approved per [facility] Refill Protocol.  Indira Fu RN

## 2019-11-11 ENCOUNTER — OFFICE VISIT (OUTPATIENT)
Dept: INTERNAL MEDICINE | Facility: CLINIC | Age: 74
End: 2019-11-11
Payer: COMMERCIAL

## 2019-11-11 VITALS
DIASTOLIC BLOOD PRESSURE: 78 MMHG | BODY MASS INDEX: 30.94 KG/M2 | HEIGHT: 60 IN | WEIGHT: 157.6 LBS | TEMPERATURE: 98 F | RESPIRATION RATE: 17 BRPM | OXYGEN SATURATION: 100 % | HEART RATE: 64 BPM | SYSTOLIC BLOOD PRESSURE: 124 MMHG

## 2019-11-11 DIAGNOSIS — R10.13 ABDOMINAL PAIN, EPIGASTRIC: ICD-10-CM

## 2019-11-11 DIAGNOSIS — Z00.00 ROUTINE HISTORY AND PHYSICAL EXAMINATION OF ADULT: Primary | ICD-10-CM

## 2019-11-11 DIAGNOSIS — E78.5 HYPERLIPIDEMIA LDL GOAL <130: ICD-10-CM

## 2019-11-11 DIAGNOSIS — J45.20 MILD INTERMITTENT ASTHMA, UNSPECIFIED WHETHER COMPLICATED: ICD-10-CM

## 2019-11-11 DIAGNOSIS — N39.498 OTHER URINARY INCONTINENCE: ICD-10-CM

## 2019-11-11 DIAGNOSIS — M81.0 OSTEOPOROSIS WITHOUT CURRENT PATHOLOGICAL FRACTURE, UNSPECIFIED OSTEOPOROSIS TYPE: ICD-10-CM

## 2019-11-11 LAB — HGB BLD-MCNC: 12.4 G/DL (ref 11.7–15.7)

## 2019-11-11 PROCEDURE — 99213 OFFICE O/P EST LOW 20 MIN: CPT | Mod: 25 | Performed by: INTERNAL MEDICINE

## 2019-11-11 PROCEDURE — 99397 PER PM REEVAL EST PAT 65+ YR: CPT | Performed by: INTERNAL MEDICINE

## 2019-11-11 PROCEDURE — 80061 LIPID PANEL: CPT | Performed by: INTERNAL MEDICINE

## 2019-11-11 PROCEDURE — 36415 COLL VENOUS BLD VENIPUNCTURE: CPT | Performed by: INTERNAL MEDICINE

## 2019-11-11 PROCEDURE — 80053 COMPREHEN METABOLIC PANEL: CPT | Performed by: INTERNAL MEDICINE

## 2019-11-11 PROCEDURE — 85018 HEMOGLOBIN: CPT | Performed by: INTERNAL MEDICINE

## 2019-11-11 RX ORDER — OXYBUTYNIN CHLORIDE 5 MG/1
5 TABLET, EXTENDED RELEASE ORAL DAILY
Qty: 90 TABLET | Refills: 3 | Status: SHIPPED | OUTPATIENT
Start: 2019-11-11

## 2019-11-11 RX ORDER — ROSUVASTATIN CALCIUM 5 MG/1
5 TABLET, COATED ORAL DAILY
Qty: 90 TABLET | Refills: 3 | Status: SHIPPED | OUTPATIENT
Start: 2019-11-11

## 2019-11-11 RX ORDER — SUCRALFATE 1 G/1
1 TABLET ORAL 3 TIMES DAILY
Qty: 90 TABLET | Refills: 5 | Status: CANCELLED | OUTPATIENT
Start: 2019-11-11

## 2019-11-11 RX ORDER — ATORVASTATIN CALCIUM 20 MG/1
20 TABLET, FILM COATED ORAL DAILY
Qty: 90 TABLET | Refills: 0 | Status: CANCELLED | OUTPATIENT
Start: 2019-11-11

## 2019-11-11 RX ORDER — PANTOPRAZOLE SODIUM 40 MG/1
40 TABLET, DELAYED RELEASE ORAL
Qty: 180 TABLET | Refills: 1 | Status: SHIPPED | OUTPATIENT
Start: 2019-11-11

## 2019-11-11 RX ORDER — ALBUTEROL SULFATE 0.83 MG/ML
2.5 SOLUTION RESPIRATORY (INHALATION) EVERY 6 HOURS PRN
Qty: 3 ML | Refills: 3 | Status: SHIPPED | OUTPATIENT
Start: 2019-11-11 | End: 2020-01-20

## 2019-11-11 RX ORDER — ALBUTEROL SULFATE 90 UG/1
2 AEROSOL, METERED RESPIRATORY (INHALATION) EVERY 6 HOURS PRN
Qty: 1 INHALER | Refills: 3 | Status: SHIPPED | OUTPATIENT
Start: 2019-11-11 | End: 2020-01-24

## 2019-11-11 ASSESSMENT — ENCOUNTER SYMPTOMS
HEARTBURN: 0
MYALGIAS: 0
PALPITATIONS: 0
CHILLS: 0
DIARRHEA: 0
FEVER: 0
ABDOMINAL PAIN: 0
NERVOUS/ANXIOUS: 0
HEMATURIA: 0
JOINT SWELLING: 0
DYSURIA: 0
WEAKNESS: 0
COUGH: 0
HEMATOCHEZIA: 0
PARESTHESIAS: 0
DIZZINESS: 0
SHORTNESS OF BREATH: 0
NAUSEA: 0
EYE PAIN: 0
HEADACHES: 0
BREAST MASS: 0
FREQUENCY: 0
CONSTIPATION: 0
ARTHRALGIAS: 0
SORE THROAT: 0

## 2019-11-11 ASSESSMENT — ACTIVITIES OF DAILY LIVING (ADL)
CURRENT_FUNCTION: SHOPPING REQUIRES ASSISTANCE
CURRENT_FUNCTION: TRANSPORTATION REQUIRES ASSISTANCE
CURRENT_FUNCTION: TELEPHONE REQUIRES ASSISTANCE
CURRENT_FUNCTION: PREPARING MEALS REQUIRES ASSISTANCE
CURRENT_FUNCTION: BATHING REQUIRES ASSISTANCE
CURRENT_FUNCTION: MONEY MANAGEMENT REQUIRES ASSISTANCE

## 2019-11-11 ASSESSMENT — MIFFLIN-ST. JEOR: SCORE: 1131.37

## 2019-11-11 NOTE — PROGRESS NOTES
"SUBJECTIVE:   Raissa Encarnacion is a 74 year old female who presents for Preventive Visit.     Are you in the first 12 months of your Medicare coverage?  No    Healthy Habits:     In general, how would you rate your overall health?  Fair    Frequency of exercise:  None    Do you usually eat at least 4 servings of fruit and vegetables a day, include whole grains    & fiber and avoid regularly eating high fat or \"junk\" foods?  Yes    Taking medications regularly:  Yes    Medication side effects:  Not applicable and Muscle aches    Ability to successfully perform activities of daily living:  Telephone requires assistance, transportation requires assistance, shopping requires assistance, preparing meals requires assistance, bathing requires assistance and money management requires assistance    Home Safety:  No safety concerns identified    Hearing Impairment:  No hearing concerns    In the past 6 months, have you been bothered by leaking of urine?  No    In general, how would you rate your overall mental or emotional health?  Excellent      PHQ-2 Total Score: 0    Additional concerns today:  No    Do you feel safe in your environment? Yes    Have you ever done Advance Care Planning? (For example, a Health Directive, POLST, or a discussion with a medical provider or your loved ones about your wishes): Yes, patient states has an Advance Care Planning document and will bring a copy to the clinic.    Hyperlipidemia Follow-Up      Are you having any of the following symptoms? (Select all that apply)  No complaints of shortness of breath, chest pain or pressure.  No increased sweating or nausea with activity.  No left-sided neck or arm pain.  No complaints of pain in calves when walking 1-2 blocks.    Are you regularly taking any medication or supplement to lower your cholesterol?   Yes- lipitor     Are you having muscle aches or other side effects that you think could be caused by your cholesterol lowering medication?  Yes- on " and off muscle aches  pts daughter would like to change Lipitor to a different medication.    Urinary incontinence: Patient is currently on oxybutynin 10 mg daily, would like to change to 5 mg at this time.      Asthma Follow-Up    Was ACT completed today?    Yes    ACT Total Scores 11/11/2019   ACT TOTAL SCORE (Goal Greater than or Equal to 20) 20   In the past 12 months, how many times did you visit the emergency room for your asthma without being admitted to the hospital? 0   In the past 12 months, how many times were you hospitalized overnight because of your asthma? 0       How many days per week do you miss taking your asthma controller medication?  I do not have an asthma controller medication    Please describe any recent triggers for your asthma: None    Have you had any Emergency Room Visits, Urgent Care Visits, or Hospital Admissions since your last office visit?  No    Osteoporosis: Patient has tried Fosamax in the past and had a allergic reaction and does not like to take any more medications for this.    Patient does not like to do mammogram and also declines vaccinations.    Fall risk  Fallen 2 or more times in the past year?: No  Any fall with injury in the past year?: No    Cognitive Screening   1) Repeat 3 items (Leader, Season, Table)    2) Clock draw: Language barrier-pt declined  3) 3 item recall: Recalls NO objects   Results: Pt declined-language barrier    Mini-CogTM Copyright S Robert. Licensed by the author for use in [facility]; reprinted with permission ([email or web address].[email or web address]). All rights reserved.      Do you have sleep apnea, excessive snoring or daytime drowsiness?: no    Reviewed and updated as needed this visit by clinical staff  Tobacco  Allergies  Meds  Med Hx  Surg Hx  Fam Hx  Soc Hx        Reviewed and updated as needed this visit by Provider          Past Medical History:   Diagnosis Date     Memory deficit 8/14/2017     S/P aortic valve replacement SVXR, life long  ASA, Plavix 9/14/2016     Uncomplicated asthma        Past Surgical History:   Procedure Laterality Date     CARDIAC SURGERY      aortic valve replacement       Current Outpatient Medications   Medication Sig Dispense Refill     albuterol (PROAIR HFA/PROVENTIL HFA/VENTOLIN HFA) 108 (90 Base) MCG/ACT inhaler Inhale 2 puffs into the lungs every 6 hours as needed for shortness of breath / dyspnea or wheezing 1 Inhaler 3     albuterol (PROVENTIL) (2.5 MG/3ML) 0.083% neb solution Take 1 vial (2.5 mg) by nebulization every 6 hours as needed for shortness of breath / dyspnea or wheezing 3 mL 3     aspirin EC 81 MG tablet Take 81 mg by mouth       calcium carbonate (TUMS) 500 MG chewable tablet 1 tab q6h prn heartburn       carboxymethylcellulose (REFRESH PLUS) 0.5 % SOLN ophthalmic solution Place 1 drop into both eyes 3 times daily as needed for dry eyes 1 Bottle 11     cholecalciferol (VITAMIN D3) 1000 UNIT tablet Take 1,000 Units by mouth       Coenzyme Q10 (COQ10) 100 MG CAPS Take 1 capsule (100 mg) by mouth daily 30 capsule 3     loratadine (CLARITIN) 10 MG tablet Take 1 tablet (10 mg) by mouth daily 90 tablet 3     Multiple Vitamin (MULTI-VITAMINS) TABS Take 1 tablet by mouth       order for DME Nebulizer machine 1 Device 0     oxybutynin ER (DITROPAN-XL) 5 MG 24 hr tablet Take 1 tablet (5 mg) by mouth daily 90 tablet 3     pantoprazole (PROTONIX) 40 MG EC tablet Take 1 tablet (40 mg) by mouth 2 times daily (before meals) 180 tablet 1     rosuvastatin (CRESTOR) 5 MG tablet Take 1 tablet (5 mg) by mouth daily 90 tablet 3     sucralfate (CARAFATE) 1 GM tablet Take 1 tablet (1 g) by mouth 3 times daily 90 tablet 5     vitamin B-Complex Take 1 tablet by mouth daily         Family History   Problem Relation Age of Onset     No Known Problems Mother      No Known Problems Father      No Known Problems Maternal Grandmother      No Known Problems Maternal Grandfather      No Known Problems Paternal Grandmother      No Known  Problems Paternal Grandfather        Social History     Tobacco Use     Smoking status: Never Smoker     Smokeless tobacco: Never Used   Substance Use Topics     Alcohol use: No     If you drink alcohol do you typically have >3 drinks per day or >7 drinks per week? No    Alcohol Use 11/11/2019   Prescreen: >3 drinks/day or >7 drinks/week? Not Applicable   No flowsheet data found.       Current providers sharing in care for this patient include:   Patient Care Team:  Hayley Francisco MD as PCP - General (Internal Medicine)  Hayley Francisco MD as Assigned PCP  Tiarra Villa LSW as Lead Care Coordinator    The following health maintenance items are reviewed in Epic and correct as of today:  Health Maintenance   Topic Date Due     ZOSTER IMMUNIZATION (1 of 2) 01/01/1994     MEDICARE ANNUAL WELLNESS VISIT  01/01/2009     PNEUMOCOCCAL IMMUNIZATION 65+ LOW/MEDIUM RISK (1 of 2 - PCV13) 01/01/2009     DTAP/TDAP/TD IMMUNIZATION (4 - Td) 06/18/2018     INFLUENZA VACCINE (1) 09/01/2019     ASTHMA CONTROL TEST  11/03/2019     ASTHMA ACTION PLAN  01/25/2020     FALL RISK ASSESSMENT  08/13/2020     MAMMO SCREENING  01/25/2021     ADVANCE CARE PLANNING  09/21/2022     LIPID  05/03/2024     COLONOSCOPY  01/25/2029     DEXA  Completed     PHQ-2  Completed     IPV IMMUNIZATION  Aged Out     MENINGITIS IMMUNIZATION  Aged Out          Review of Systems   Constitutional: Negative for chills and fever.   HENT: Negative for congestion, ear pain, hearing loss and sore throat.    Eyes: Negative for pain and visual disturbance.   Respiratory: Negative for cough and shortness of breath.    Cardiovascular: Negative for chest pain, palpitations and peripheral edema.   Gastrointestinal: Negative for abdominal pain, constipation, diarrhea, heartburn, hematochezia and nausea.   Breasts:  Negative for tenderness, breast mass and discharge.   Genitourinary: Negative for dysuria, frequency, genital sores, hematuria,  pelvic pain, urgency, vaginal bleeding and vaginal discharge.   Musculoskeletal: Negative for arthralgias, joint swelling and myalgias.   Skin: Negative for rash.   Neurological: Negative for dizziness, weakness, headaches and paresthesias.   Psychiatric/Behavioral: Negative for mood changes. The patient is not nervous/anxious.         OBJECTIVE:   /78   Pulse 64   Temp 98  F (36.7  C) (Oral)   Resp 17   Ht 1.524 m (5')   Wt 71.5 kg (157 lb 9.6 oz)   LMP  (LMP Unknown)   SpO2 100%   BMI 30.78 kg/m   Estimated body mass index is 30.78 kg/m  as calculated from the following:    Height as of this encounter: 1.524 m (5').    Weight as of this encounter: 71.5 kg (157 lb 9.6 oz).  Physical Exam  GENERAL: healthy, alert and no distress  EYES: Eyes grossly normal to inspection, PERRL and conjunctivae and sclerae normal  HENT: ear canals and TM's normal, nose and mouth without ulcers or lesions  NECK: no adenopathy, no asymmetry, masses, or scars and thyroid normal to palpation  RESP: lungs clear to auscultation - no rales, rhonchi or wheezes  BREAST: normal without masses, tenderness or nipple discharge and no palpable axillary masses or adenopathy  CV: regular rate and rhythm,    ABDOMEN: soft, nontender, no hepatosplenomegaly, no masses and bowel sounds normal  MS: no gross musculoskeletal defects noted, no edema  NEURO: Normal strength and tone, mentation intact and speech normal  PSYCH: mentation appears normal, affect normal/bright      ASSESSMENT / PLAN:      (Z00.00) Routine history and physical examination of adult  (primary encounter diagnosis)  Plan: Hemoglobin, Comprehensive metabolic panel,         Lipid panel reflex to direct LDL Fasting        Declines mammogram     (J45.20) Mild intermittent asthma, unspecified whether complicated  Plan: refilled albuterol (PROVENTIL) (2.5 MG/3ML) 0.083% neb         solution, albuterol (PROAIR HFA/PROVENTIL         HFA/VENTOLIN HFA) 108 (90 Base) MCG/ACT inhaler  .explained clearly about the medication,insructions and side effects.            (E78.5) Hyperlipidemia LDL goal <130  Comment:on and off muscle aching with Lipitor,   Plan: Patient was advised to discontinue Lipitor and started on low-dose rosuvastatin (CRESTOR) 5 MG tablet once daily.explained clearly about the medication,insructions and side effects.            (R10.13) Abdominal pain, epigastric  Plan: pantoprazole (PROTONIX) 40 MG EC tablet refilled as directed.explained clearly about the medication,insructions and side effects.            (N39.498) Other urinary incontinence  Plan: Decrease oxybutynin ER (DITROPAN-XL) to 5 MG 24 hr tablet as patient requested.explained clearly about the medication,insructions and side effects.  Call or return to clinic prn if these symtoms worsen, fail to improve as anticipated, or if new symptoms develop.            (M81.0) Osteoporosis without current pathological fracture, unspecified osteoporosis type  Plan: Allergic to bisphosphonates, patient was advised to take 1200 mg of calcium with vitamin D supplements        COUNSELING:  Reviewed preventive health counseling, as reflected in patient instructions       Regular exercise       Healthy diet/nutrition       Immunizations    Declined: Influenza, Td and Prevnar         Estimated body mass index is 30.78 kg/m  as calculated from the following:    Height as of this encounter: 1.524 m (5').    Weight as of this encounter: 71.5 kg (157 lb 9.6 oz).         reports that she has never smoked. She has never used smokeless tobacco.      Appropriate preventive services were discussed with this patient, including applicable screening as appropriate for cardiovascular disease, diabetes, osteopenia/osteoporosis, and glaucoma.  As appropriate for age/gender, discussed screening for colorectal cancer, prostate cancer, breast cancer, and cervical cancer. Checklist reviewing preventive services available has been given to the  patient.    Reviewed patients plan of care and provided an AVS. The Basic Care Plan (routine screening as documented in Health Maintenance) for Raissa meets the Care Plan requirement. This Care Plan has been established and reviewed with the Patient and daughter.    Counseling Resources:  ATP IV Guidelines  Pooled Cohorts Equation Calculator  Breast Cancer Risk Calculator  FRAX Risk Assessment  ICSI Preventive Guidelines  Dietary Guidelines for Americans, 2010  peer[employer]'s MyPlate  ASA Prophylaxis  Lung CA Screening    Efren Quinn MD  [facility]    Identified Health Risks:

## 2019-11-11 NOTE — LETTER
November 19, 2019      Raissa Encarnacion  [address]  [facility] [place] [facility] [address]        Dear ,    We are writing to inform you of your test results.         I have reviewed all lab results which are normal or stable except slightly low good cholesterol advise  regular exercise.     Resulted Orders   Hemoglobin   Result Value Ref Range    Hemoglobin 12.4 11.7 - 15.7 g/dL   Comprehensive metabolic panel   Result Value Ref Range    Sodium 139 133 - 144 mmol/L    Potassium 4.2 3.4 - 5.3 mmol/L    Chloride 105 94 - 109 mmol/L    Carbon Dioxide 27 20 - 32 mmol/L    Anion Gap 7 3 - 14 mmol/L    Glucose 93 70 - 99 mg/dL      Comment:      Fasting specimen    Urea Nitrogen 19 7 - 30 mg/dL    Creatinine 0.92 0.52 - 1.04 mg/dL    GFR Estimate 61 >60 mL/min/[1.73_m2]      Comment:      Non  GFR Calc  Starting 12/18/2018, serum creatinine based estimated GFR (eGFR) will be   calculated using the Chronic Kidney Disease Epidemiology Collaboration   (CKD-EPI) equation.      GFR Estimate If Black 70 >60 mL/min/[1.73_m2]      Comment:       GFR Calc  Starting 12/18/2018, serum creatinine based estimated GFR (eGFR) will be   calculated using the Chronic Kidney Disease Epidemiology Collaboration   (CKD-EPI) equation.      Calcium 9.3 8.5 - 10.1 mg/dL    Bilirubin Total 0.5 0.2 - 1.3 mg/dL    Albumin 3.9 3.4 - 5.0 g/dL    Protein Total 7.2 6.8 - 8.8 g/dL    Alkaline Phosphatase 99 40 - 150 U/L    ALT 31 0 - 50 U/L    AST 23 0 - 45 U/L   Lipid panel reflex to direct LDL Fasting   Result Value Ref Range    Cholesterol 167 <200 mg/dL    Triglycerides 134 <150 mg/dL      Comment:      Fasting specimen    HDL Cholesterol 46 (L) >49 mg/dL    LDL Cholesterol Calculated 94 <100 mg/dL      Comment:      Desirable:       <100 mg/dl    Non HDL Cholesterol 121 <130 mg/dL       If you have any questions or concerns, please call the clinic at the number listed above.       Sincerely,    Efren JACKSON  MD Kai

## 2019-11-12 LAB
ALBUMIN SERPL-MCNC: 3.9 G/DL (ref 3.4–5)
ALP SERPL-CCNC: 99 U/L (ref 40–150)
ALT SERPL W P-5'-P-CCNC: 31 U/L (ref 0–50)
ANION GAP SERPL CALCULATED.3IONS-SCNC: 7 MMOL/L (ref 3–14)
AST SERPL W P-5'-P-CCNC: 23 U/L (ref 0–45)
BILIRUB SERPL-MCNC: 0.5 MG/DL (ref 0.2–1.3)
BUN SERPL-MCNC: 19 MG/DL (ref 7–30)
CALCIUM SERPL-MCNC: 9.3 MG/DL (ref 8.5–10.1)
CHLORIDE SERPL-SCNC: 105 MMOL/L (ref 94–109)
CHOLEST SERPL-MCNC: 167 MG/DL
CO2 SERPL-SCNC: 27 MMOL/L (ref 20–32)
CREAT SERPL-MCNC: 0.92 MG/DL (ref 0.52–1.04)
GFR SERPL CREATININE-BSD FRML MDRD: 61 ML/MIN/{1.73_M2}
GLUCOSE SERPL-MCNC: 93 MG/DL (ref 70–99)
HDLC SERPL-MCNC: 46 MG/DL
LDLC SERPL CALC-MCNC: 94 MG/DL
NONHDLC SERPL-MCNC: 121 MG/DL
POTASSIUM SERPL-SCNC: 4.2 MMOL/L (ref 3.4–5.3)
PROT SERPL-MCNC: 7.2 G/DL (ref 6.8–8.8)
SODIUM SERPL-SCNC: 139 MMOL/L (ref 133–144)
TRIGL SERPL-MCNC: 134 MG/DL

## 2019-11-12 ASSESSMENT — ASTHMA QUESTIONNAIRES: ACT_TOTALSCORE: 20

## 2019-11-13 ENCOUNTER — TELEPHONE (OUTPATIENT)
Dept: INTERNAL MEDICINE | Facility: CLINIC | Age: 74
End: 2019-11-13

## 2019-11-13 DIAGNOSIS — M81.0 OSTEOPOROSIS WITHOUT CURRENT PATHOLOGICAL FRACTURE, UNSPECIFIED OSTEOPOROSIS TYPE: Primary | ICD-10-CM

## 2019-11-13 NOTE — TELEPHONE ENCOUNTER
Call to Pts Daughter. She states she went to the pharmacy this AM. The pharmacist told her that [facility] called them and told the pharmacist directly,  that pt should not be taking Aspirin anymore.     Pt has history of heart surgery and dtr thought she had to continue aspirin.     Please advise if there is any changes made.     Hemoglobin   Date Value Ref Range Status   11/11/2019 12.4 11.7 - 15.7 g/dL Final   ]

## 2019-11-13 NOTE — TELEPHONE ENCOUNTER
Patient was told not to take a aspirin and she is not sure why since she has had open heart surgery. Ok to call and tyler [phone]

## 2019-11-14 NOTE — TELEPHONE ENCOUNTER
Call to pharmacy. They states this was originally prescribed by Cardiology. Advised to refax to the [facility] as pt was just there in June 2019, so they should be able to refill it. The pharmacist will fax the request to them.     She is also asking to refill the Vitamin D as her insurance will cover it.     Call to Ali. She states she can buy the Calcium OTC, if we could just send in RN for Vitamin D. Faxed.   Advised to let us know if she has any issues with the Aspirin, but her Heart Clinic should be able to refill. She agrees.

## 2019-11-14 NOTE — TELEPHONE ENCOUNTER
I do not know who called pts pharmacy and told not to take aspirin. No records of any phone call in epic. Pt should be on aspirin indefinably acc to her cardiologist note 06/19 and aspirin is over the counter never prescribed from our clinic.

## 2020-01-16 DIAGNOSIS — J45.20 MILD INTERMITTENT ASTHMA, UNSPECIFIED WHETHER COMPLICATED: ICD-10-CM

## 2020-01-16 NOTE — TELEPHONE ENCOUNTER
"Requested Prescriptions   Pending Prescriptions Disp Refills     albuterol (PROVENTIL) (2.5 MG/3ML) 0.083% neb solution 3 mL 3     Sig: Take 1 vial (2.5 mg) by nebulization every 6 hours as needed for shortness of breath / dyspnea or wheezing       Asthma Maintenance Inhalers - Anticholinergics Passed - 1/16/2020  9:11 AM        Passed - Patient is age 12 years or older        Passed - Asthma control assessment score within normal limits in last 6 months     Please review ACT score.           Passed - Medication is active on med list        Passed - Recent (6 mo) or future (30 days) visit within the authorizing provider's specialty     Patient had office visit in the last 6 months or has a visit in the next 30 days with authorizing provider or within the authorizing provider's specialty.  See \"Patient Info\" tab in inbasket, or \"Choose Columns\" in Meds & Orders section of the refill encounter.            Last Written Prescription Date:  11/11/19  Last Fill Quantity: 1,  # refills: 3   Last office visit: 11/11/2019 with prescribing provider:  5/3/19   Future Office Visit:      "

## 2020-01-20 RX ORDER — ALBUTEROL SULFATE 0.83 MG/ML
2.5 SOLUTION RESPIRATORY (INHALATION) EVERY 6 HOURS PRN
Qty: 3 ML | Refills: 1 | Status: SHIPPED | OUTPATIENT
Start: 2020-01-20

## 2020-01-22 ENCOUNTER — HOSPITAL ENCOUNTER (OUTPATIENT)
Dept: GENERAL RADIOLOGY | Facility: CLINIC | Age: 75
Discharge: HOME | End: 2020-01-22
Attending: NURSE PRACTITIONER | Admitting: NURSE PRACTITIONER
Payer: COMMERCIAL

## 2020-01-22 ENCOUNTER — OFFICE VISIT (OUTPATIENT)
Dept: INTERNAL MEDICINE | Facility: CLINIC | Age: 75
End: 2020-01-22
Payer: COMMERCIAL

## 2020-01-22 ENCOUNTER — HOSPITAL ENCOUNTER (OUTPATIENT)
Dept: GENERAL RADIOLOGY | Facility: CLINIC | Age: 75
End: 2020-01-22
Attending: NURSE PRACTITIONER
Payer: COMMERCIAL

## 2020-01-22 VITALS
SYSTOLIC BLOOD PRESSURE: 118 MMHG | RESPIRATION RATE: 16 BRPM | OXYGEN SATURATION: 100 % | HEIGHT: 60 IN | WEIGHT: 156.5 LBS | DIASTOLIC BLOOD PRESSURE: 50 MMHG | HEART RATE: 61 BPM | BODY MASS INDEX: 30.73 KG/M2 | TEMPERATURE: 97.8 F

## 2020-01-22 DIAGNOSIS — M79.5 FOREIGN BODY (FB) IN SOFT TISSUE: ICD-10-CM

## 2020-01-22 DIAGNOSIS — K59.00 CONSTIPATION, UNSPECIFIED CONSTIPATION TYPE: ICD-10-CM

## 2020-01-22 DIAGNOSIS — K21.9 GASTROESOPHAGEAL REFLUX DISEASE WITHOUT ESOPHAGITIS: Primary | ICD-10-CM

## 2020-01-22 DIAGNOSIS — K59.00 CONSTIPATION, UNSPECIFIED CONSTIPATION TYPE: Primary | ICD-10-CM

## 2020-01-22 DIAGNOSIS — B07.0 PLANTAR WARTS: ICD-10-CM

## 2020-01-22 DIAGNOSIS — K21.9 GASTROESOPHAGEAL REFLUX DISEASE WITHOUT ESOPHAGITIS: ICD-10-CM

## 2020-01-22 DIAGNOSIS — J45.20 MILD INTERMITTENT ASTHMA, UNSPECIFIED WHETHER COMPLICATED: ICD-10-CM

## 2020-01-22 PROCEDURE — 73610 X-RAY EXAM OF ANKLE: CPT | Mod: RT

## 2020-01-22 PROCEDURE — 74019 RADEX ABDOMEN 2 VIEWS: CPT

## 2020-01-22 PROCEDURE — 99214 OFFICE O/P EST MOD 30 MIN: CPT | Performed by: NURSE PRACTITIONER

## 2020-01-22 RX ORDER — POLYETHYLENE GLYCOL 3350 17 G/17G
1 POWDER, FOR SOLUTION ORAL DAILY
Qty: 255 G | Refills: 1 | Status: SHIPPED | OUTPATIENT
Start: 2020-01-22

## 2020-01-22 RX ORDER — METOPROLOL SUCCINATE 25 MG/1
TABLET, EXTENDED RELEASE ORAL
COMMUNITY
Start: 2020-01-14

## 2020-01-22 ASSESSMENT — MIFFLIN-ST. JEOR: SCORE: 1121.38

## 2020-01-22 NOTE — TELEPHONE ENCOUNTER
Requested Prescriptions   Pending Prescriptions Disp Refills     calcium carbonate (TUMS) 500 MG chewable tablet       Si tab q6h prn heartburn       There is no refill protocol information for this order              Last Written Prescription Date:    Last Fill Quantity: ,   # refills:   Last Office Visit: 20  Future Office visit:       Routing refill request to provider for review/approval because:  Medication is reported/historical
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
## 2020-01-23 PROCEDURE — 87338 HPYLORI STOOL AG IA: CPT | Performed by: NURSE PRACTITIONER

## 2020-01-24 DIAGNOSIS — K21.9 GASTROESOPHAGEAL REFLUX DISEASE WITHOUT ESOPHAGITIS: ICD-10-CM

## 2020-01-24 RX ORDER — ALBUTEROL SULFATE 90 UG/1
2 AEROSOL, METERED RESPIRATORY (INHALATION) EVERY 6 HOURS PRN
Qty: 3 INHALER | Refills: 0 | Status: SHIPPED | OUTPATIENT
Start: 2020-01-24

## 2020-01-24 NOTE — TELEPHONE ENCOUNTER
"Requested Prescriptions   Pending Prescriptions Disp Refills     calcium carbonate (TUMS) 500 MG chewable tablet       Si tab q6h prn heartburn       There is no refill protocol information for this order        albuterol (PROAIR HFA/PROVENTIL HFA/VENTOLIN HFA) 108 (90 Base) MCG/ACT inhaler 1 Inhaler 3     Sig: Inhale 2 puffs into the lungs every 6 hours as needed for shortness of breath / dyspnea or wheezing       Asthma Maintenance Inhalers - Anticholinergics Passed - 2020  2:38 PM        Passed - Patient is age 12 years or older        Passed - Asthma control assessment score within normal limits in last 6 months     Please review ACT score.           Passed - Medication is active on med list        Passed - Recent (6 mo) or future (30 days) visit within the authorizing provider's specialty     Patient had office visit in the last 6 months or has a visit in the next 30 days with authorizing provider or within the authorizing provider's specialty.  See \"Patient Info\" tab in inbasket, or \"Choose Columns\" in Meds & Orders section of the refill encounter.            * 2020- patient had visit with Brooke Mckoen about GERD> patient also taking Protonix.     Prescription approved per G Refill Protoco: Albuterol. Inhaler    Kendra Aranda RN Flex    "

## 2020-01-25 RX ORDER — CALCIUM CARBONATE 500 MG/1
TABLET, CHEWABLE ORAL
Qty: 90 TABLET | Refills: 1 | Status: SHIPPED | OUTPATIENT
Start: 2020-01-25

## 2020-01-27 LAB — H PYLORI AG STL QL IA: NEGATIVE

## 2020-01-28 ENCOUNTER — OFFICE VISIT (OUTPATIENT)
Dept: PODIATRY | Facility: CLINIC | Age: 75
End: 2020-01-28
Attending: NURSE PRACTITIONER
Payer: COMMERCIAL

## 2020-01-28 VITALS
HEIGHT: 60 IN | WEIGHT: 165.5 LBS | SYSTOLIC BLOOD PRESSURE: 116 MMHG | BODY MASS INDEX: 32.49 KG/M2 | DIASTOLIC BLOOD PRESSURE: 60 MMHG

## 2020-01-28 DIAGNOSIS — L84 CALLUS: Primary | ICD-10-CM

## 2020-01-28 PROCEDURE — 99203 OFFICE O/P NEW LOW 30 MIN: CPT | Performed by: PODIATRIST

## 2020-01-28 RX ORDER — AMMONIUM LACTATE 12 G/100G
CREAM TOPICAL
Qty: 385 G | Refills: 1 | Status: SHIPPED | OUTPATIENT
Start: 2020-01-28

## 2020-01-28 ASSESSMENT — MIFFLIN-ST. JEOR: SCORE: 1162.2

## 2020-01-28 NOTE — PATIENT INSTRUCTIONS
Thank you for choosing [facility]!    DR. LAUREN'S CLINIC LOCATIONS:   MONDAY - [place] TUESDAY AM - [place]   [address]  [address]   [place] [address] [place] [address]   [phone]       THURSDAY AM - [place] THURSDAY PM - [place]   [address]   [place] [address] [place] [address]   [phone]       FRIDAY AM - [place] SET UP SURGERY: [phone] [address] APPOINTMENTS: [phone]   [place] [address] BILLING QUESTIONS: [phone] FAX NUMBER: [phone]     CALLUS / CORN / IPK CARE  When there is excessive friction or pressure on the skin, the body responds by making the skin thicker to protect the deeper structures from becoming exposed. While this works well to protect the deeper structures, the thickened skin can cause increased pressure and pain.    Callus: flat, diffuse thickened areas are simple calluses and they are usually caused by friction. Often these are the result of rubbing on a shoe or from going barefoot.    Corns: calluses with a central core on or between the toes are called corns. These result from prominent joints on toes rubbing together. Theses are a symptom of bone malalignment or illfitting shoes and will always recur unless the underlying bones are addressed surgically.    IPK: calluses with a central core on the ball of the foot are usually IPKs (intractable plantar keratosis). These are caused by excessive pressure from the metatarsals, the bones that make up the ball of the foot. Often one of these bones is too long or too prominent. Again, these will always recur unless the underlying bone issue is addressed. There is no cure for these. They will either go away by themselves, recur, or more could develop.    TREATMENT RECOMENDATIONS  - File: Trim them down with a pumice stone or callus file a couple times a week to  remove callus tissue that builds up. An electric callus removing device. Amope Pedi Perfect Electronic Pedicure Foot File and Callus Remover can be a good option.   - Moisturize: Lotion can be applied to soften the callus. A lactic acid or urea based cream such as Carmol, Kersal or Vanicream or thicker cream with shea butter are good options.   - Foot Gear: Good supportive shoes and minimizing barefoot walking can slow down callus formation and can decrease pain levels. Gel inserts can also provide padding to the bottom of the foot to prevent pain and slow recurrence. Toe spacers, toe covers, can custom orthotic inserts can be beneficial as well.  - Surgery: If there is a surgical pathology noted, such as a prominent bone, often this needs to be addressed surgically to minimize recurrence. However, sometimes the lesion simply migrates to another spot after surgery, so it is not a guaranteed cure.

## 2020-01-28 NOTE — PROGRESS NOTES
"Foot & Ankle Surgery  January 28, 2020    CC: \"foot caluse(possible wart)\"    I was asked to see Raissa Encarnacion regarding the chief complaint by:  LILIANA Mckeon CNP    HPI:  Pt is a 75 year old female who presents with above complaint.  Painful area lateral R 5th MPJ x 1 year.  Looking for \"some help\".  Describes deep ache.  Pain 7/10 \"when wear shoes(or any contact).  Worse with contact/pressure.  Nothing for treatment.  Was told it may be a wart    ROS:   Pos for CC.  The patient denies current nausea, vomiting, chills, fevers, belly pain, calf pain, chest pain or SOB.  Complete remainder of ROS is otherwise neg.    VITALS:    Vitals:    01/28/20 0912   BP: 116/60   Weight: 75.1 kg (165 lb 8 oz)   Height: 1.524 m (5')       PMH:    Past Medical History:   Diagnosis Date     Memory deficit 8/14/2017     S/P aortic valve replacement Abbott, life long ASA, Plavix 9/14/2016     Uncomplicated asthma        SXHX:    Past Surgical History:   Procedure Laterality Date     CARDIAC SURGERY      aortic valve replacement        MEDS:    Current Outpatient Medications   Medication     albuterol (PROAIR HFA/PROVENTIL HFA/VENTOLIN HFA) 108 (90 Base) MCG/ACT inhaler     albuterol (PROVENTIL) (2.5 MG/3ML) 0.083% neb solution     aspirin EC 81 MG tablet     calcium carbonate (TUMS) 500 MG chewable tablet     carboxymethylcellulose (REFRESH PLUS) 0.5 % SOLN ophthalmic solution     Coenzyme Q10 (COQ10) 100 MG CAPS     loratadine (CLARITIN) 10 MG tablet     metoprolol succinate ER (TOPROL-XL) 25 MG 24 hr tablet     order for DME     oxybutynin ER (DITROPAN-XL) 5 MG 24 hr tablet     pantoprazole (PROTONIX) 40 MG EC tablet     polyethylene glycol (MIRALAX/GLYCOLAX) powder     rosuvastatin (CRESTOR) 5 MG tablet     vitamin B-Complex     vitamin D3 (CHOLECALCIFEROL) 1000 units (25 mcg) tablet     No current facility-administered medications for this visit.        ALL:     Allergies   Allergen Reactions     Omeprazole Other (See Comments)     Not " helping     Fosamax [Alendronic Acid] Rash       FMH:  Neg for RA, foot problems, diabetes  Family History   Problem Relation Age of Onset     No Known Problems Mother      No Known Problems Father      No Known Problems Maternal Grandmother      No Known Problems Maternal Grandfather      No Known Problems Paternal Grandmother      No Known Problems Paternal Grandfather        SocHx:    Social History     Socioeconomic History     Marital status: Single     Spouse name: Not on file     Number of children: Not on file     Years of education: Not on file     Highest education level: Not on file   Occupational History     Not on file   Social Needs     Financial resource strain: Not on file     Food insecurity:     Worry: Not on file     Inability: Not on file     Transportation needs:     Medical: Not on file     Non-medical: Not on file   Tobacco Use     Smoking status: Never Smoker     Smokeless tobacco: Never Used   Substance and Sexual Activity     Alcohol use: No     Drug use: No     Sexual activity: Never   Lifestyle     Physical activity:     Days per week: Not on file     Minutes per session: Not on file     Stress: Not on file   Relationships     Social connections:     Talks on phone: Not on file     Gets together: Not on file     Attends Bahai service: Not on file     Active member of club or organization: Not on file     Attends meetings of clubs or organizations: Not on file     Relationship status: Not on file     Intimate partner violence:     Fear of current or ex partner: Not on file     Emotionally abused: Not on file     Physically abused: Not on file     Forced sexual activity: Not on file   Other Topics Concern     Parent/sibling w/ CABG, MI or angioplasty before 65F 55M? Not Asked   Social History Narrative     Not on file           EXAMINATION:  Gen:   No apparent distress  Neuro:   A&Ox3, no deficits  Psych:    Answering questions appropriately for age and situation with normal  affect  Head:    NCAT  Eye:    Visual scanning without deficit  Ear:    Response to auditory stimuli wnl  Lung:    Non-labored breathing on RA noted  Abd:    NTND per patient report  Lymph:    Neg for pitting/non-pitting edema BLE  Vasc:    Pulses palpable, CFT minimally delayed  Neuro:    Light touch sensation intact to all sensory nerve distributions without paresthesias  Derm:    Nucleated hyperkeratotic lesion lateral R 5th MPJ without verrucous changes.  MSK:    ROM, strength wnl without limitation, no pain on palpation noted.  Calf:    Neg for redness, swelling or tenderness    Assessment:  75 year old female with painful nucleated hyperkeratotic lesion lateral R 5th MPJ      Plan:  Discussed etiologies, anatomy and options  1.  painful nucleated hyperkeratotic lesion lateral R 5th MPJ  -We discussed that many hyper-keratotic lesions are caused by pressure or shearing forces on the skin, and these can often be treated sufficiently with shoes, inserts and local tissue debridement.  Some lesions, however, can have a deep core, and while home treatments are helpful, occasional clinical debridement may be necessary.  In certain cases, surgical excision may be required if no other treatments have proven sufficient.  -Our home instruction handout was dispensed, detailing home therapies to minimize regrowth of the hyperkeratotic tissue.    -we discussed a rough estimate of the cost of debridement in clinic, and how insurance may not cover the cost meaning the patient may be responsible.    -debrided with 15 blade without incident; no charge  -Rx for Lac hydrin cream    Follow up:  prn or sooner with acute issues      Patient's medical history was reviewed today    Body mass index is 32.32 kg/m .  Weight management plan: Patient was referred to their PCP to discuss a diet and exercise plan.        Davian Duarte DPM FACFAS FACFAOM  Podiatric Foot & Ankle Surgeon  [facility]  [phone]

## 2020-03-31 ENCOUNTER — PATIENT OUTREACH (OUTPATIENT)
Dept: GERIATRIC MEDICINE | Facility: CLINIC | Age: 75
End: 2020-03-31

## 2020-03-31 NOTE — LETTER
April 7, 2020      KLAUDIA RAPP  [address]  [place] [address]        Dear Klaudia:     Your Care Coordinator has been unable to reach you by telephone. I am writing to ask you or a family member to call me at [phone]. If you reach my voicemail, please leave a message with your daytime telephone number and a date and time that I can call you. If you are hearing impaired, please call the [place] [employer] at 073 or [phone] ([email or web address] relay service).     The reason I am trying to reach you is:    [x] Six (6) month check-in  [] To schedule your annual assessment  [] Other:      Please call me as soon as you receive this letter. I look forward to speaking with you.    Sincerely,    OTIS Carr    E-mail: [email or web address]  Phone: [phone]      [facility] is a health plan that contracts with both Medicare and the [place] Medical Assistance (Medicaid) program to provide benefits of both programs to enrollees. Enrollment in [facility] depends on contract renewal.    [id number])    [id number] (11/18)

## 2020-04-02 NOTE — PROGRESS NOTES
4/2/2020  Called daughterCecelia, [phone] to complete six month assessment and left a message requesting a return call.     OTIS Carr  [employer]   [phone] (fax)  [email or web address]

## 2020-04-07 ENCOUNTER — PATIENT OUTREACH (OUTPATIENT)
Dept: GERIATRIC MEDICINE | Facility: CLINIC | Age: 75
End: 2020-04-07

## 2020-04-07 NOTE — PROGRESS NOTES
PC from Magdalena, financial worker at [employer], [phone].  Calling to let me know she was notified that ssi has ended due to her traveling out of the country to [facility].  She is closing MA effective 5/1/20. If for some reason she is in [place] someone should contact Magdalena so she can get the MA back open.    L/M for [place] Quality care. Asked for a call back to see if PCA/HM are on hold and if they were notified of Raissa traveling out of the country.     VM from Annie at [facility]. Family reported that she would be out of town. Last date of service was 3/10/20. They did not say when/if she would be back. [phone] ext [phone]    OTIS Carr  [employer]   [phone] (fax)  [email or web address]

## 2020-04-07 NOTE — PROGRESS NOTES
4/7/20  Called daughterCecelia, [phone] to complete six month assessment and left a message requesting a return call.   Asked CMS to mail unable to contact letter.    OTIS Carr  [employer]   [phone] (fax)  [email or web address]

## 2020-04-07 NOTE — PROGRESS NOTES
"[facility] Care Coordination Contact    Per CC, mailed client an \"Unable to Contact\" letter for 6th month assessment.     Nicole Greene  Care Management Specialist  [facility]  [phone]    "

## 2020-05-01 NOTE — PROGRESS NOTES
4/13/20  L/M for Rob, son, [phone].  Asked for a call back to verify if his mom is still outside of [place]. Wondering if she is coming back. The unable to contact letter came back as undeliverable.     OTIS Carr  [employer]   [phone] (fax)  [email or web address]

## 2020-07-07 ENCOUNTER — PATIENT OUTREACH (OUTPATIENT)
Dept: GERIATRIC MEDICINE | Facility: CLINIC | Age: 75
End: 2020-07-07

## 2020-07-07 NOTE — PROGRESS NOTES
L/M for daughter, Cecelia.  Asked if her mom is back in the country.  It is time for her annual assessment.    L/M for Rob, son. Asked if his mom is back and if not, asked if she plans to come back.  Asked for a call back.    PC to PCA agency.  They have Raissa's case closed for now. Last date of service was 3/10/20. He thinks she will be gone long term.  He will call if they family contacts him to resume services.    OTIS Carr  [employer]   [phone] (fax)  [email or web address]

## 2020-07-07 NOTE — LETTER
August 3, 2020    Important [employer] Information    KLAUDIA DIONTE  [address] [address] [address]  I'm Here to Help  Dear Klaudia,  My name is OTIS Carr, and I am your [employer] Care Coordinator. You indicated you are not interested in meeting with me to complete a health risk assessment.   As a Care Coordinator, I am here to help. My role is to make sure that your health plan is working for you. I am available to:     Review your health care needs with you over the phone or in-person     Provide support for and information about covered services or supplies to help keep you safe and healthy in your home    Answer questions about your insurance     Help you find a provider, such as a doctor or dentist, to meet your unique needs    Enclosed is a Self-Risk Assessment form that we ask you to fill out so we can get to know you a little bit better. Once completed, please send back in the self-addressed stamped envelope.     Questions?  Call me at [phone] Monday-Friday between 8am and 5pm. TTY/TDD: 711. If you d like, a friend or family member may call for you.   You may also call [employer] Customer Service at [phone] or [phone] (toll free) from 8 a.m. - 8 p.m. Central, seven days a week. Access to representatives may be limited at times. TTY/TDD: 711.  Sincerely,    OTIS Carr    E-mail: [email or web address]  Phone: [phone]      [facility]             cc: member records                Civil Rights Notice  Discrimination is against the law. [employer] does not discriminate on the basis of any of the following:    Race    Color    National Origin    Creed    Anabaptism    Age    Public Assistance Status    Receipt of Health Care Services    Disability (including physical or mental impairment)    Sex (including sex stereotypes and gender identity)    Marital Status    Political Beliefs    Medical Condition    Genetic Information    Sexual Orientation    Claims  Experience    Medical History    Health Status    Auxiliary Aids and Services:  [employer] provides auxiliary aids and services, like qualified interpreters or information in accessible formats, free of charge and in a timely manner, to ensure an equal opportunity to participate in our health care programs. Contact [employer] Customer Service at IDx/contact medicaid or call [phone] (toll free); TTY:711 or at IDx/contactmedicaid.    Language Assistance Services:  [employer] provides translated documents and spoken language interpreting, free of charge and in a timely manner, when language assistance services are necessary to ensure limited English speakers have meaningful access to our information and services. Contact [employer] at -[phone] (toll free); TTY: 717 or IDx/contactmedicaid.     Civil Rights Complaints  You have the right to file a discrimination complaint if you believe you were treated in a discriminatory way by [employer]. You may contact any of the following four agencies directly to file a discrimination complaint.    U.S. Department of Health and Human Services  Office for Civil Rights (OCR)  You have the right to file a complaint with the OCR, a federal agency, if you believe you have been discriminated against because of any of the following:    Race    Disability    Color    Sex    National Origin    Age      Contact the OCR directly to file a complaint:         Director         U.S. Department of Health and Human Services  Office for Civil Rights         [address] [place] [address]         [phone] (Voice)         [phone] (TDD)         Complaint Portal - https://ocrportal.hhs.gov/ocr/portal/lobby.jsf     [place] Department of Human Rights (MDHR)  In [place], you have the right to file a complaint with the [facility] if you believe you have been discriminated against because of any of the following:       Race    Color    National Origin    Sikhism    Creed    Sex    Sexual Orientation    Marital Status    Public Assistance Status    Disability    Contact the MDHR directly to file a complaint:         [place] Department of Human Rights         [facility], [address] [place] [address]         [phone] (voice)          [phone] (toll free)         711 or [phone] ([place] Relay)         [phone] (Fax)         [email or web address] (Email)     [place] Department of Human Services (DHS)  You have the right to file a complaint with [facility] if you believe you have been discriminated against in our health care programs because of any of the following:    Race    Color    National Origin    Creed    Sikhism    Age    Public Assistance Status    Receipt of Health Care Services    Disability (including physical or mental impairment)    Sex (including sex stereotypes and gender identity)    Marital Status    Political Beliefs    Medical Condition    Genetic Information    Sexual Orientation    Claims Experience    Medical History    Health Status    Complaints must be in writing and filed within 180 days of the date you discovered the alleged discrimination. The complaint must contain your name and address and describe the discrimination you are complaining about. After we get your complaint, we will review it and notify you in writing about whether we have authority to investigate. If we do, we will investigate the complaint.      [facility] will notify you in writing of the investigation s outcome. You have a right to appeal the outcome if you disagree with the decision. To appeal, you must send a written request to have [facility] review the investigation outcome period. Be brief and state why you disagree with the decision. Include additional information you think is important.      If you file a complaint in this way, the people who work for the agency named in the complaint cannot retaliate  against you. This means they cannot punish you in any way for filing a complaint. Filing a complaint in this way does not stop you from seeking out other legal or administration actions.     Contact [facility] directly to file a discrimination complaint:        Civil Rights Coordinator        [place] Department of Human Services        Equal Opportunity and Access Division        [address] [address]        [place] [address] [phone] (voice) or use your preferred relay service     [employer] Complaint Notice   You have the right to file a complaint with [employer] if you believe you have been discriminated against because of any of the following:       Medical condition    Health status    Receipt of health care services    Claims experience    Medical history    Genetic information    Disability (including mental or physical impairment)    Marital status    Age    Sex (including sex stereotypes and gender identity)    Sexual orientation    National origin    Race    Color    Methodist    Creed    Public assistance status    Political beliefs    You can file a complaint and ask for help in filing a complaint in person or by mail, phone, fax, or email at:     [employer] Civil Rights Coordinator  [facility]  [address], [place] [address] [phone] (voice and fax) or TTY:263  Email: [email or web address]    American Indians can continue to use [facility] clinics. We will not require prior approval or impose any conditions for you to get services at these clinics. For elders age 65 years and older this includes Elderly Waiver (EW) services accessed through the [facility]. If a doctor or other provider in a [facility] or [facility] clinic refers you to a provider in our network, we will not require you to see your primary care provider prior to the referral.    For accessible formats of this publication or assistance with equal or access to our services, visit  [email or web address], or call [phone] (toll free) or use your preferred relay service.

## 2020-07-29 NOTE — PROGRESS NOTES
7/23/20  PC to sonRob.  He reports that his mom is still currently out of the Country. Her plan is to return but no flights available to get back. His sister is with her.  They will contact CC when she returns to [place].    [facility] Refusal Telephone Assessment    Member refused home visit HRA on 7/23/20 (reason: [place] of the [place]).    ER visits: No  Hospitalizations: No  Health concerns: no  Falls/Injuries: No  ADL/IADL Dependencies: n/a   Member currently receiving the following services: None    Informal support(s): family- [place] of [place]    [facility] sponsored benefits: Shared information re: Silver Sneakers/gym memberships, ASA, Calcium +D.    Follow-Up Plan: Member informed of future contact, plan to f/u with member with a 6 month telephone assessment and offer a home visit.  Contact information shared with member and family, encouraged member to call with any questions or concerns at any time.    Requested CMS to mail refusal letter.    OTIS Carr  [facility] [employer]   [phone] (fax)  [email or web address]

## 2020-08-03 NOTE — PROGRESS NOTES
"[facility] Care Coordination Contact    Per CC, mailed client a \"Refusal of Home Visit\" letter.    Mailed member [employer] Self Report Health Assessment with self addressed return envelope.     Nicole Greene  Care Management Specialist  [facility]  [phone]      "

## 2020-08-08 ENCOUNTER — PATIENT OUTREACH (OUTPATIENT)
Dept: GERIATRIC MEDICINE | Facility: CLINIC | Age: 75
End: 2020-08-08

## 2020-08-08 NOTE — PROGRESS NOTES
[facility] Care Coordination Contact    Received a request to submit a DTR for the terminated of [employer], lifeline and EW. Documentation completed and faxed to the health plan. Care Coordinator aware.    Becky Sotomayor RN  Utilization   [facility]  [phone]

## 2021-01-25 ENCOUNTER — PATIENT OUTREACH (OUTPATIENT)
Dept: GERIATRIC MEDICINE | Facility: CLINIC | Age: 76
End: 2021-01-25

## 2021-01-25 NOTE — PROGRESS NOTES
Insurance change from [employer] MSHO to [employer] MSC+ effective 9/1/20. Disenrolled from [facility] 8/31/20.    OTIS Carr  [facility]   [phone] (fax)  [email or web address]

## 2021-01-26 PROBLEM — Z76.89 HEALTH CARE HOME: Status: RESOLVED | Noted: 2017-09-07 | Resolved: 2021-01-26

## 2024-06-17 PROBLEM — Z71.89 ADVANCED DIRECTIVES, COUNSELING/DISCUSSION: Status: RESOLVED | Noted: 2017-09-21 | Resolved: 2024-06-17
